# Patient Record
Sex: FEMALE | Race: WHITE | URBAN - METROPOLITAN AREA
[De-identification: names, ages, dates, MRNs, and addresses within clinical notes are randomized per-mention and may not be internally consistent; named-entity substitution may affect disease eponyms.]

---

## 2018-09-07 ENCOUNTER — TELEPHONE (OUTPATIENT)
Dept: FAMILY MEDICINE CLINIC | Age: 58
End: 2018-09-07

## 2018-09-07 NOTE — TELEPHONE ENCOUNTER
Patient phoned to say that she would like to come in for lab work in order to received refills on her medications for October. Does she to make an appt with the doctor for a follow up or can she have a lab request sent to [facility] to have the labwork without an appt scheduled.

## 2018-10-09 ENCOUNTER — CLINICAL SUPPORT (OUTPATIENT)
Dept: FAMILY MEDICINE CLINIC | Age: 58
End: 2018-10-09

## 2018-10-09 VITALS
DIASTOLIC BLOOD PRESSURE: 92 MMHG | HEIGHT: 60 IN | HEART RATE: 51 BPM | SYSTOLIC BLOOD PRESSURE: 182 MMHG | TEMPERATURE: 98.2 F | WEIGHT: 141 LBS | OXYGEN SATURATION: 99 % | BODY MASS INDEX: 27.68 KG/M2 | RESPIRATION RATE: 18 BRPM

## 2018-10-09 DIAGNOSIS — Z23 ENCOUNTER FOR IMMUNIZATION: ICD-10-CM

## 2018-10-09 DIAGNOSIS — I10 ESSENTIAL HYPERTENSION: Primary | ICD-10-CM

## 2018-10-09 DIAGNOSIS — E03.9 ACQUIRED HYPOTHYROIDISM: ICD-10-CM

## 2018-10-09 RX ORDER — LISINOPRIL 10 MG/1
20 TABLET ORAL DAILY
Qty: 90 TAB | Refills: 1 | Status: SHIPPED | OUTPATIENT
Start: 2018-10-09 | End: 2018-10-09 | Stop reason: CLARIF

## 2018-10-09 RX ORDER — LISINOPRIL 20 MG/1
20 TABLET ORAL DAILY
Qty: 90 TAB | Refills: 1 | Status: SHIPPED | OUTPATIENT
Start: 2018-10-09 | End: 2019-04-30 | Stop reason: SDUPTHER

## 2018-10-09 RX ORDER — LEVOTHYROXINE SODIUM 100 UG/1
TABLET ORAL
COMMUNITY
Start: 2018-09-22 | End: 2018-10-09 | Stop reason: SDUPTHER

## 2018-10-09 RX ORDER — LISINOPRIL 10 MG/1
TABLET ORAL DAILY
COMMUNITY
End: 2018-10-09 | Stop reason: SDUPTHER

## 2018-10-09 RX ORDER — LEVOTHYROXINE SODIUM 100 UG/1
100 TABLET ORAL
Qty: 90 TAB | Refills: 1 | Status: SHIPPED | OUTPATIENT
Start: 2018-10-09 | End: 2019-04-22 | Stop reason: SDUPTHER

## 2018-10-09 NOTE — PROGRESS NOTES
Darcie Jones is a 60 y.o. female presenting for Hypertension and Thyroid Problem HTN The patient presents today for HTN follow-up. Currently taking lisinopril 10 mg Taking medications daily w/o complications. Admits missing some doses Home BP readings range from [employer] SIde effects of meds:  None dry cough in past that resolved Comorbid conditions:  No hypercholesterolemia, nondiabetic, former smoker Aspirin? No 
 
Hypothyroidism Levothyroxine 100mcg Doesn't miss that medication Last blood work 6 months ago. Needs flu shot and Shingrix ROS Past Medical History:  
Diagnosis Date  
 HTN (hypertension)  Hypothyroidism History reviewed. No pertinent surgical history. Family History Problem Relation Age of Onset  Elevated Lipids Mother  Hypertension Mother  Pancreatic Cancer Mother  Collagen Dis Father  Colon Cancer Father Social History Social History  Marital status:  Spouse name: N/A  
 Number of children: N/A  
 Years of education: N/A Occupational History  Not on file. Social History Main Topics  Smoking status: Former Smoker Packs/day: 1.00 Types: Cigarettes  Smokeless tobacco: Never Used  Alcohol use No  
 Drug use: No  
 Sexual activity: Not on file Other Topics Concern  Not on file Social History Narrative  No narrative on file Current Outpatient Prescriptions Medication Sig Dispense Refill  docosahexanoic acid/epa (FISH OIL PO) Take  by mouth.  lisinopril (PRINIVIL, ZESTRIL) 10 mg tablet Take 2 Tabs by mouth daily. 90 Tab 1  
 levothyroxine (SYNTHROID) 100 mcg tablet Take 1 Tab by mouth Daily (before breakfast). 90 Tab 1  varicella-zoster recombinant, PF, (SHINGRIX) 50 mcg/0.5 mL susr injection 0.5 mL by IntraMUSCular route once for 1 dose. Repeat second dose in 6 months. 0.5 mL 1  
 levothyroxine sodium (LEVOTHYROXINE PO) Take  by mouth. No Known Allergies Vitals:  
 10/09/18 1637 BP: (!) 182/92 Pulse: (!) 51 Resp: 18 Temp: 98.2 °F (36.8 °C) TempSrc: Oral  
SpO2: 99% Weight: 141 lb (64 kg) Height: 4' 11.5\" (1.511 m) Body mass index is 28 kg/(m^2). BP repeated and confirmed Objective General: Patient alert and oriented and in NAD HEENT: PER/EOMI, no conjunctival pallor or scleral icterus. No thyromegaly or cervical lymphadenopathy Heart: Regular rate and rhythm, No murmurs, rubs or gallops. Lungs: Clear to auscultation bilaterally, no wheezing, rales or rhonchi Ext: No edema Skin: No rashes or lesions noted on exposed skin Neuro: AAOx3 Psych: Appropriate mood and affect Assessment and Plan: 1. Essential hypertension Some compliance issues, missing some doses. Perhaps slightly worse due to aleve. Not adequately controlled Increase lisinopril to 20 mg per day Regular BP checks. Ways to improve compliance discussed. - lisinopril (PRINIVIL, ZESTRIL) 10 mg tablet; Take 2 Tabs by mouth daily. Dispense: 90 Tab; Refill: 1 
- METABOLIC PANEL, BASIC 
- MICROALBUMIN, UR, RAND W/ MICROALB/CREAT RATIO 
- LIPID PANEL 
- HEPATIC FUNCTION PANEL 2. Acquired hypothyroidism Taking more consistently  For labs - levothyroxine (SYNTHROID) 100 mcg tablet; Take 1 Tab by mouth Daily (before breakfast). Dispense: 90 Tab; Refill: 1 
- TSH 3RD GENERATION 3. Encounter for immunization Immunizations updated. - Influenza virus vaccine (FLUZONE HIGH-DOSE) 65 years and older 
- varicella-zoster recombinant, PF, (SHINGRIX) 50 mcg/0.5 mL susr injection; 0.5 mL by IntraMUSCular route once for 1 dose. Repeat second dose in 6 months. Dispense: 0.5 mL; Refill: 1 Diagnosis and plan discussed with patient who verbillized understanding. Follow-up Disposition: 
Return in about 1 month (around 11/9/2018) for Blood pressure follow up.  
 
[facility]

## 2018-10-09 NOTE — MR AVS SNAPSHOT
[address] [place] [address] [address] 
[phone] Patient: Mera Plasencia MRN: [id number] [facility]:[birth year] Visit Information Date & Time Provider Department Dept. Phone Encounter #  
 10/9/2018  4:15 PM MD Andie Steele [id number] [phone] [id number] Follow-up Instructions Return in about 1 month (around 11/9/2018) for Blood pressure follow up. Upcoming Health Maintenance Date Due Hepatitis C Screening 1960 PAP AKA CERVICAL CYTOLOGY 4/15/1980 Shingrix Vaccine Age 50> (1 of 2) 4/15/2009 BREAST CANCER SCRN MAMMOGRAM 4/15/2009 FOBT Q 1 YEAR AGE 50-75 4/15/2009 Influenza Age 5 to Adult 8/1/2018 DTaP/Tdap/Td series (2 - Td) 10/1/2027 Allergies as of 10/9/2018  Review Complete On: 10/9/2018 By: Erin Fowler LPN No Known Allergies Current Immunizations  Never Reviewed Name Date Tdap 10/1/2017 Not reviewed this visit You Were Diagnosed With   
  
 Codes Comments Essential hypertension    -  Primary ICD-10-CM: I10 
ICD-9-CM: 401.9 Acquired hypothyroidism     ICD-10-CM: E03.9 ICD-9-CM: 057. 9 Vitals BP Pulse Temp Resp Height(growth percentile) Weight(growth percentile) (!) 182/92 (BP 1 Location: Right arm, BP Patient Position: Sitting) (!) 51 98.2 °F (36.8 °C) (Oral) 18 4' 11.5\" (1.511 m) 141 lb (64 kg) SpO2 BMI OB Status Smoking Status 99% 28 kg/m2 Postmenopausal Former Smoker Vitals History BMI and BSA Data Body Mass Index Body Surface Area  
 28 kg/m 2 1.64 m 2 Preferred Pharmacy Pharmacy Name Phone [facility] [address] [place] [address] [phone] Your Updated Medication List  
  
   
This list is accurate as of 10/9/18  5:22 PM.  Always use your most recent med list.  
  
  
  
  
 FISH OIL PO Take  by mouth.   
  
 * LEVOTHYROXINE PO  
 Take  by mouth. * levothyroxine 100 mcg tablet Commonly known as:  SYNTHROID Take 1 Tab by mouth Daily (before breakfast). lisinopril 10 mg tablet Commonly known as:  Kiera Torres Take 2 Tabs by mouth daily. * Notice: This list has 2 medication(s) that are the same as other medications prescribed for you. Read the directions carefully, and ask your doctor or other care provider to review them with you. Prescriptions Sent to Pharmacy Refills  
 lisinopril (PRINIVIL, ZESTRIL) 10 mg tablet 1 Sig: Take 2 Tabs by mouth daily. Class: Normal  
 Pharmacy: [id number] [address] Ph #: [phone] Route: Oral  
 levothyroxine (SYNTHROID) 100 mcg tablet 1 Sig: Take 1 Tab by mouth Daily (before breakfast). Class: Normal  
 Pharmacy: [id number], [facility] [address] Ph #: [phone] Route: Oral  
  
We Performed the Following HEPATIC FUNCTION PANEL [54906 CPT(R)] LIPID PANEL [14664 CPT(R)] METABOLIC PANEL, BASIC [45889 CPT(R)] MICROALBUMIN, UR, RAND W/ MICROALB/CREAT RATIO [id number] CPT(R)] TSH 3RD GENERATION [08261 CPT(R)] Follow-up Instructions Return in about 1 month (around 11/9/2018) for Blood pressure follow up. Patient Instructions Hypertension: Advised the patient to meassure blood pressure at home and to bring logs at the next visit. Advised to be compliant with BP medication. Recommendations for Hypertension (elevated blood pressure):   
· Purchase a blood pressure cuff that goes around your upper arm and check blood pressure at least 3 times per week. Write down your numbers for review. Check blood pressure in the morning and evening.  Rest for 5 minutes with feet elevated and arm resting on a table (at mid-chest level) when checking blood pressure   
· Exercise 30-60 minutes most days of the week, preferably with a mix of cardiovascular and strength training. Exercise can improve blood pressure, even if you do not lose weight!   
· Limit alcohol intake to less than 3-4 drinks per week.  · Avoid tobacco products   
· Avoid illegal drugs especially amphetamines · DASH diet - includes fruits, vegetables, fiber, and less than 2000 mg sodium (salt) daily.   
· Try to eat a low sugar diet. Sugar worsens diabetes and activates kidney hormones that raise blood pressure.  · Avoid non-steroidal inflammatory medications (NSAIDS) such as ibuprofen, advil, motrin, aleve, and naproxyn as these may increase blood pressure if used long-term   
· Avoid OTC decongestants such as pseudopherine, phenylephrine, Afrin Patient is counseled to return to the office if symptoms do not improve as expected.  Urgent consultation with the nearest Emergency Department is strongly recommended if condition worsens.  Patient is counseled to follow up as recommended and to inform the office if any changes in treatment are recommended.  Introducing \[facility] [place] [facility]\"" & HEALTH SERVICES! [facility] introduces ScreenTag patient portal. Now you can access parts of your medical record, email your doctor's office, and request medication refills online. 1. In your internet browser, go to [email or web address] 2. Click on the First Time User? Click Here link in the Sign In box. You will see the New Member Sign Up page. 3. Enter your ScreenTag Access Code exactly as it appears below. You will not need to use this code after youve completed the sign-up process. If you do not sign up before the expiration date, you must request a new code. · ScreenTag Access Code: [email or web address] Expires: 1/7/2019  3:45 PM 
 
4. Enter the last four digits of your Social Security Number (xxxx) and Date of Birth (mm/dd/yyyy) as indicated and click Submit. You will be taken to the next sign-up page. 5. Create a ScreenTag ID.  This will be your ScreenTag login ID and cannot be changed, so think of one that is secure and easy to remember. 6. Create a [employer] password. You can change your password at any time. 7. Enter your Password Reset Question and Answer. This can be used at a later time if you forget your password. 8. Enter your e-mail address. You will receive e-mail notification when new information is available in [address] 9. Click Sign Up. You can now view and download portions of your medical record. 10. Click the Download Summary menu link to download a portable copy of your medical information. If you have questions, please visit the Frequently Asked Questions section of the [employer] website. Remember, [employer] is NOT to be used for urgent needs. For medical emergencies, dial 911. Now available from your iPhone and Android! Please provide this summary of care documentation to your next provider. Your primary care clinician is listed as Richard Ramirez. If you have any questions after today's visit, please call [phone].

## 2018-10-09 NOTE — PATIENT INSTRUCTIONS
Hypertension: Advised the patient to meassure blood pressure at home and to bring logs at the next visit. Advised to be compliant with BP medication. Recommendations for Hypertension (elevated blood pressure):   
· Purchase a blood pressure cuff that goes around your upper arm and check blood pressure at least 3 times per week. Write down your numbers for review. Check blood pressure in the morning and evening. Rest for 5 minutes with feet elevated and arm resting on a table (at mid-chest level) when checking blood pressure   
· Exercise 30-60 minutes most days of the week, preferably with a mix of cardiovascular and strength training. Exercise can improve blood pressure, even if you do not lose weight!   
· Limit alcohol intake to less than 3-4 drinks per week.  · Avoid tobacco products   
· Avoid illegal drugs especially amphetamines · DASH diet - includes fruits, vegetables, fiber, and less than 2000 mg sodium (salt) daily.   
· Try to eat a low sugar diet. Sugar worsens diabetes and activates kidney hormones that raise blood pressure.  · Avoid non-steroidal inflammatory medications (NSAIDS) such as ibuprofen, advil, motrin, aleve, and naproxyn as these may increase blood pressure if used long-term   
· Avoid OTC decongestants such as pseudopherine, phenylephrine, Afrin Patient is counseled to return to the office if symptoms do not improve as expected.  Urgent consultation with the nearest Emergency Department is strongly recommended if condition worsens.  Patient is counseled to follow up as recommended and to inform the office if any changes in treatment are recommended.

## 2018-10-10 ENCOUNTER — TELEPHONE (OUTPATIENT)
Dept: FAMILY MEDICINE CLINIC | Age: 58
End: 2018-10-10

## 2018-10-10 NOTE — TELEPHONE ENCOUNTER
Pharmacist of [employer] on  [facility], [place], [address] [place] [address]  Tel: [phone]    Seeking clarification on patients medication for daily dose of Lisinopril, either 10mg or 20mg. Can you contact pharmacy to clarify this please?     Thank You  Freada Cranker

## 2018-10-14 LAB
ALBUMIN SERPL-MCNC: 4.7 G/DL (ref 3.5–5.5)
ALBUMIN/CREAT UR: <16.2 MG/G CREAT (ref 0–30)
ALP SERPL-CCNC: 66 IU/L (ref 39–117)
ALT SERPL-CCNC: 8 IU/L (ref 0–32)
AST SERPL-CCNC: 15 IU/L (ref 0–40)
BILIRUB DIRECT SERPL-MCNC: 0.08 MG/DL (ref 0–0.4)
BILIRUB SERPL-MCNC: 0.3 MG/DL (ref 0–1.2)
BUN SERPL-MCNC: 16 MG/DL (ref 6–24)
BUN/CREAT SERPL: 27 (ref 9–23)
CALCIUM SERPL-MCNC: 9.5 MG/DL (ref 8.7–10.2)
CHLORIDE SERPL-SCNC: 103 MMOL/L (ref 96–106)
CHOLEST SERPL-MCNC: 156 MG/DL (ref 100–199)
CO2 SERPL-SCNC: 24 MMOL/L (ref 20–29)
CREAT SERPL-MCNC: 0.6 MG/DL (ref 0.57–1)
CREAT UR-MCNC: 18.5 MG/DL
GLUCOSE SERPL-MCNC: 87 MG/DL (ref 65–99)
HDLC SERPL-MCNC: 50 MG/DL
LDLC SERPL CALC-MCNC: 85 MG/DL (ref 0–99)
MICROALBUMIN UR-MCNC: <3 UG/ML
POTASSIUM SERPL-SCNC: 4.4 MMOL/L (ref 3.5–5.2)
PROT SERPL-MCNC: 6.8 G/DL (ref 6–8.5)
SODIUM SERPL-SCNC: 141 MMOL/L (ref 134–144)
TRIGL SERPL-MCNC: 107 MG/DL (ref 0–149)
TSH SERPL DL<=0.005 MIU/L-ACNC: 1.1 UIU/ML (ref 0.45–4.5)
VLDLC SERPL CALC-MCNC: 21 MG/DL (ref 5–40)

## 2018-10-15 NOTE — PROGRESS NOTES
Called patient advised of lab results, need to take daily BP at home and to return in 1 month for appointment with Dr. Yosvany Lozano to assess BP control. Patient stated understanding.

## 2018-10-15 NOTE — PROGRESS NOTES
Call patient. Blood work is fine. Should see me in about a month to recheck BP Get BP checks in the interim.  
RH

## 2018-11-15 ENCOUNTER — OFFICE VISIT (OUTPATIENT)
Dept: FAMILY MEDICINE CLINIC | Age: 58
End: 2018-11-15

## 2018-11-15 VITALS
SYSTOLIC BLOOD PRESSURE: 138 MMHG | OXYGEN SATURATION: 98 % | HEART RATE: 59 BPM | WEIGHT: 142 LBS | BODY MASS INDEX: 27.88 KG/M2 | RESPIRATION RATE: 18 BRPM | HEIGHT: 60 IN | TEMPERATURE: 98.2 F | DIASTOLIC BLOOD PRESSURE: 82 MMHG

## 2018-11-15 DIAGNOSIS — I10 ESSENTIAL HYPERTENSION: Primary | ICD-10-CM

## 2018-11-15 NOTE — PROGRESS NOTES
Bernice Friday is a 60 y.o. female presenting for Elevated Blood Pressure      History of Present Illness:  HTN followup  Taking higher dose of ACE consistently    No problems with meds. 80% of BP's below 140 syst.    Review of Systems   Respiratory: Negative for cough and shortness of breath. Cardiovascular: Negative for chest pain. Neurological: Negative for loss of consciousness. Past Medical History:   Diagnosis Date    HTN (hypertension)     Hypothyroidism      History reviewed. No pertinent surgical history. Family History   Problem Relation Age of Onset    Elevated Lipids Mother     Hypertension Mother     Pancreatic Cancer Mother     Collagen Dis Father     Colon Cancer Father      Social History     Socioeconomic History    Marital status:      Spouse name: Not on file    Number of children: Not on file    Years of education: Not on file    Highest education level: Not on file   Social Needs    Financial resource strain: Not on file    Food insecurity - worry: Not on file    Food insecurity - inability: Not on file   [employer] needs - medical: Not on file   [employer] needs - non-medical: Not on file   Occupational History    Not on file   Tobacco Use    Smoking status: Former Smoker     Packs/day: 1.00     Types: Cigarettes    Smokeless tobacco: Never Used   Substance and Sexual Activity    Alcohol use: No    Drug use: No    Sexual activity: Not on file   Other Topics Concern    Not on file   Social History Narrative    Not on file         Current Outpatient Medications   Medication Sig Dispense Refill    docosahexanoic acid/epa (FISH OIL PO) Take  by mouth.  levothyroxine (SYNTHROID) 100 mcg tablet Take 1 Tab by mouth Daily (before breakfast). 90 Tab 1    lisinopril (PRINIVIL, ZESTRIL) 20 mg tablet Take 1 Tab by mouth daily.  90 Tab 1         No Known Allergies    Vitals:    11/15/18 0944   BP: 138/82   Pulse: (!) 59   Resp: 18   Temp: 98.2 °F (36.8 °C)   TempSrc: Oral   SpO2: 98%   Weight: 142 lb (64.4 kg)   Height: 4' 11.5\" (1.511 m)     Body mass index is 28.2 kg/m². Objective  General: Patient alert and oriented and in NAD  HEENT: PER/EOMI, no conjunctival pallor or scleral icterus. No thyromegaly or cervical lymphadenopathy  Heart: Regular rate and rhythm, No murmurs, rubs or gallops. Lungs: Clear to auscultation bilaterally, no wheezing, rales or rhonchi  Ext: No edema  Skin: No rashes or lesions noted on exposed skin  Neuro: AAOx3  Psych: Appropriate mood and affect      Assessment and Plan:    1. Essential hypertension at goal  Taking meds consistently, BP goals discussed. FU 5-6 months or sooner if not at goal        Diagnosis and plan discussed with patient who verbillized understanding.     Follow-up Disposition: Not on File    [facility]

## 2019-04-22 ENCOUNTER — TELEPHONE (OUTPATIENT)
Dept: FAMILY MEDICINE CLINIC | Age: 59
End: 2019-04-22

## 2019-04-22 DIAGNOSIS — I10 ESSENTIAL HYPERTENSION: Primary | ICD-10-CM

## 2019-04-22 DIAGNOSIS — E78.00 HYPERCHOLESTEREMIA: ICD-10-CM

## 2019-04-28 LAB
ALBUMIN SERPL-MCNC: 4.3 G/DL (ref 3.6–4.8)
ALP SERPL-CCNC: 70 IU/L (ref 39–117)
ALT SERPL-CCNC: 11 IU/L (ref 0–32)
AST SERPL-CCNC: 13 IU/L (ref 0–40)
BILIRUB DIRECT SERPL-MCNC: 0.1 MG/DL (ref 0–0.4)
BILIRUB SERPL-MCNC: 0.3 MG/DL (ref 0–1.2)
BUN SERPL-MCNC: 20 MG/DL (ref 8–27)
BUN/CREAT SERPL: 29 (ref 12–28)
CALCIUM SERPL-MCNC: 9.7 MG/DL (ref 8.7–10.3)
CHLORIDE SERPL-SCNC: 102 MMOL/L (ref 96–106)
CHOLEST SERPL-MCNC: 143 MG/DL (ref 100–199)
CO2 SERPL-SCNC: 22 MMOL/L (ref 20–29)
CREAT SERPL-MCNC: 0.68 MG/DL (ref 0.57–1)
GLUCOSE SERPL-MCNC: 96 MG/DL (ref 65–99)
HDLC SERPL-MCNC: 51 MG/DL
LDLC SERPL CALC-MCNC: 80 MG/DL (ref 0–99)
POTASSIUM SERPL-SCNC: 4.6 MMOL/L (ref 3.5–5.2)
PROT SERPL-MCNC: 6.6 G/DL (ref 6–8.5)
SODIUM SERPL-SCNC: 140 MMOL/L (ref 134–144)
TRIGL SERPL-MCNC: 62 MG/DL (ref 0–149)
VLDLC SERPL CALC-MCNC: 12 MG/DL (ref 5–40)

## 2019-04-30 ENCOUNTER — OFFICE VISIT (OUTPATIENT)
Dept: FAMILY MEDICINE CLINIC | Age: 59
End: 2019-04-30

## 2019-04-30 VITALS
TEMPERATURE: 98.2 F | WEIGHT: 143 LBS | BODY MASS INDEX: 28.07 KG/M2 | HEIGHT: 60 IN | OXYGEN SATURATION: 100 % | DIASTOLIC BLOOD PRESSURE: 79 MMHG | SYSTOLIC BLOOD PRESSURE: 145 MMHG | RESPIRATION RATE: 18 BRPM | HEART RATE: 68 BPM

## 2019-04-30 DIAGNOSIS — E03.9 ACQUIRED HYPOTHYROIDISM: ICD-10-CM

## 2019-04-30 DIAGNOSIS — I10 ESSENTIAL HYPERTENSION: Primary | ICD-10-CM

## 2019-04-30 RX ORDER — LISINOPRIL 20 MG/1
20 TABLET ORAL DAILY
Qty: 90 TAB | Refills: 1 | Status: SHIPPED | OUTPATIENT
Start: 2019-04-30 | End: 2019-10-22 | Stop reason: SDUPTHER

## 2019-04-30 RX ORDER — LEVOTHYROXINE SODIUM 100 UG/1
TABLET ORAL
Qty: 90 TAB | Refills: 1 | Status: SHIPPED | OUTPATIENT
Start: 2019-04-30 | End: 2019-10-22 | Stop reason: SDUPTHER

## 2019-04-30 NOTE — PROGRESS NOTES
Assessment and Plan 1. Acquired hypothyroidism Euthyroid on replacement, no problems, continue same dose - levothyroxine (SYNTHROID) 100 mcg tablet; TAKE ONE TABLET BY MOUTH DAILY BEFORE BREAKFAST  Dispense: 90 Tab; Refill: 1 2. Essential hypertension Near goal here today and well under goal at home with cuff that we have checked and found to be accurate 
- lisinopril (PRINIVIL, ZESTRIL) 20 mg tablet; Take 1 Tab by mouth daily. Dispense: 90 Tab; Refill: 1 Follow-up and Dispositions · Return in about 6 months (around 10/30/2019) for Blood pressure follow up, Medication follow up. Diagnosis and plan discussed with patient who verbillized understanding. History of present illness:Jalil Willough[place] is a 60 y.o. female presenting for Medication Refill Hypertension Doing well No chest pain or dyspnea Mows lawn without symptoms (push mower) Hypothyroid TSH OK in October Takes consistently Review of Systems Respiratory: Negative for shortness of breath. Cardiovascular: Negative for chest pain, palpitations and leg swelling. Gastrointestinal: Negative. Genitourinary: Negative. Neurological: Negative for headaches. Past Medical History:  
Diagnosis Date  
 HTN (hypertension)  Hypothyroidism History reviewed. No pertinent surgical history. Family History Problem Relation Age of Onset  Elevated Lipids Mother  Hypertension Mother  Pancreatic Cancer Mother  Collagen Dis Father  Colon Cancer Father Social History Socioeconomic History  Marital status:  Spouse name: Not on file  Number of children: Not on file  Years of education: Not on file  Highest education level: Not on file Occupational History  Not on file Social Needs  Financial resource strain: Not on file  Food insecurity:  
  Worry: Not on file Inability: Not on file  Transportation needs:  
  Medical: Not on file Non-medical: Not on file Tobacco Use  Smoking status: Former Smoker Packs/day: 1.00 Types: Cigarettes  Smokeless tobacco: Never Used Substance and Sexual Activity  Alcohol use: No  
 Drug use: No  
 Sexual activity: Not on file Lifestyle  Physical activity:  
  Days per week: Not on file Minutes per session: Not on file  Stress: Not on file Relationships  Social connections:  
  Talks on phone: Not on file Gets together: Not on file Attends Anabaptism service: Not on file Active member of club or organization: Not on file Attends meetings of clubs or organizations: Not on file Relationship status: Not on file  Intimate partner violence:  
  Fear of current or ex partner: Not on file Emotionally abused: Not on file Physically abused: Not on file Forced sexual activity: Not on file Other Topics Concern  Not on file Social History Narrative  Not on file Current Outpatient Medications Medication Sig Dispense Refill  levothyroxine (SYNTHROID) 100 mcg tablet TAKE ONE TABLET BY MOUTH DAILY BEFORE BREAKFAST 90 Tab 1  
 lisinopril (PRINIVIL, ZESTRIL) 20 mg tablet Take 1 Tab by mouth daily. 90 Tab 1  
 docosahexanoic acid/epa (FISH OIL PO) Take  by mouth. No Known Allergies Vitals:  
 04/30/19 1629 BP: 145/79 Pulse: 68 Resp: 18 Temp: 98.2 °F (36.8 °C) TempSrc: Oral  
SpO2: 100% Weight: 143 lb (64.9 kg) Height: 4' 11.5\" (1.511 m) Body mass index is 28.4 kg/m². Objective General: Patient alert and oriented and in NAD Eyes: PER/EOMI, no conjunctival pallor or scleral icterus. Neck: No thyromegaly or cervical lymphadenopathy Cardiovascular: Heart has regular rate and rhythm, No murmurs, rubs or gallops. No edema Respiratory: Lungs are clear to auscultation bilaterally, no wheezing, rales or rhonchi, normal chest excursion and no increased work of breathing. Musculoskeletal: All four extremities present and functional.  
Skin: No rashes or lesions noted on exposed skin Neuro: AAOx3, normal gait and speech. No gross neurologic deficits. Psych: Appropriate mood and affect, no homicidal or suicidal ideation, no obsessions, delusions or hallucinations, normal psychomotor status.

## 2019-04-30 NOTE — PROGRESS NOTES
Identified pt with two pt identifiers(name and ). Reviewed record in preparation for visit and have obtained necessary documentation. All patient medications has been reviewed. Chief Complaint Patient presents with  Medication Refill Health Maintenance Due Topic  Hepatitis C Screening  PAP AKA CERVICAL CYTOLOGY  Shingrix Vaccine Age 50> (1 of 2)  BREAST CANCER SCRN MAMMOGRAM   
 FOBT Q 1 YEAR AGE 50-75 Vitals:  
 19 1629 BP: 145/79 Pulse: 68 Resp: 18 Temp: 98.2 °F (36.8 °C) TempSrc: Oral  
SpO2: 100% Weight: 143 lb (64.9 kg) Height: 4' 11.5\" (1.511 m) PainSc:   0 - No pain Coordination of Care Questionnaire: 
:  
1) Have you been to an emergency room, urgent care, or hospitalized since your last visit?   no    
 
2. Have seen or consulted any other health care provider since your last visit? NO 
 
3) Do you have an Advanced Directive/ Living Will in place? NO If yes, do we have a copy on file NO If no, would you like information NO Patient is accompanied by self I have received verbal consent from Denita Reddy to discuss any/all medical information while they are present in the room.

## 2019-10-22 ENCOUNTER — OFFICE VISIT (OUTPATIENT)
Dept: FAMILY MEDICINE CLINIC | Age: 59
End: 2019-10-22

## 2019-10-22 VITALS
SYSTOLIC BLOOD PRESSURE: 152 MMHG | OXYGEN SATURATION: 100 % | DIASTOLIC BLOOD PRESSURE: 84 MMHG | WEIGHT: 145.5 LBS | RESPIRATION RATE: 16 BRPM | HEIGHT: 60 IN | BODY MASS INDEX: 28.57 KG/M2 | HEART RATE: 66 BPM | TEMPERATURE: 98.3 F

## 2019-10-22 DIAGNOSIS — E03.9 ACQUIRED HYPOTHYROIDISM: ICD-10-CM

## 2019-10-22 DIAGNOSIS — I10 ESSENTIAL HYPERTENSION: ICD-10-CM

## 2019-10-22 DIAGNOSIS — Z23 ENCOUNTER FOR IMMUNIZATION: Primary | ICD-10-CM

## 2019-10-22 RX ORDER — LEVOTHYROXINE SODIUM 100 UG/1
TABLET ORAL
Qty: 90 TAB | Refills: 1 | Status: SHIPPED | OUTPATIENT
Start: 2019-10-22 | End: 2020-04-06 | Stop reason: SDUPTHER

## 2019-10-22 RX ORDER — LISINOPRIL 20 MG/1
20 TABLET ORAL DAILY
Qty: 90 TAB | Refills: 1 | Status: SHIPPED | OUTPATIENT
Start: 2019-10-22 | End: 2020-04-06 | Stop reason: SDUPTHER

## 2019-10-22 NOTE — PROGRESS NOTES
Assessment and Plan    1. Acquired hypothyroidism  Refilled. Take consistently  - levothyroxine (SYNTHROID) 100 mcg tablet; TAKE ONE TABLET BY MOUTH DAILY BEFORE BREAKFAST  Dispense: 90 Tab; Refill: 1    2. Essential hypertension  Not at goal today but we have only two BP's and some BP's at home are normal  To check frequently and see us if not at goal  - lisinopril (PRINIVIL, ZESTRIL) 20 mg tablet; Take 1 Tab by mouth daily. Dispense: 90 Tab; Refill: 1    3. Encounter for immunization  Flu shot. - INFLUENZA VIRUS VAC QUAD,SPLIT,PRESV FREE SYRINGE IM      Follow-up and Dispositions    · Return in about 6 months (around 4/22/2020) for Blood pressure follow up. Diagnosis and plan discussed with patient who verbillized understanding. History of present Jeanine Collins is a 60 y.o. female presenting for Hypertension and Immunization/Injection (Flu shot )    Hypertension  Home BP's variable  Some in 120's, no other sx  Elevated today    Hypothyroid  Takes meds consistently    Needs flu shot    Review of Systems   Respiratory: Negative for shortness of breath. Cardiovascular: Negative for chest pain and palpitations. Gastrointestinal: Negative. Genitourinary: Negative. Psychiatric/Behavioral: Negative. Past Medical History:   Diagnosis Date    HTN (hypertension)     Hypothyroidism      History reviewed. No pertinent surgical history.   Family History   Problem Relation Age of Onset    Elevated Lipids Mother     Hypertension Mother     Pancreatic Cancer Mother     Collagen Dis Father     Colon Cancer Father      Social History     Socioeconomic History    Marital status:      Spouse name: Not on file    Number of children: Not on file    Years of education: Not on file    Highest education level: Not on file   Occupational History    Not on file   Social Needs    Financial resource strain: Not on file    Food insecurity:     Worry: Not on file     Inability: Not on file   [employer] needs:     Medical: Not on file     Non-medical: Not on file   Tobacco Use    Smoking status: Former Smoker     Packs/day: 1.00     Types: Cigarettes    Smokeless tobacco: Never Used   Substance and Sexual Activity    Alcohol use: No    Drug use: Never    Sexual activity: Yes   Lifestyle    Physical activity:     Days per week: Not on file     Minutes per session: Not on file    Stress: Not on file   Relationships    Social connections:     Talks on phone: Not on file     Gets together: Not on file     Attends Protestant service: Not on file     Active member of club or organization: Not on file     Attends meetings of clubs or organizations: Not on file     Relationship status: Not on file    Intimate partner violence:     Fear of current or ex partner: Not on file     Emotionally abused: Not on file     Physically abused: Not on file     Forced sexual activity: Not on file   Other Topics Concern    Not on file   Social History Narrative    Not on file         Current Outpatient Medications   Medication Sig Dispense Refill    levothyroxine (SYNTHROID) 100 mcg tablet TAKE ONE TABLET BY MOUTH DAILY BEFORE BREAKFAST 90 Tab 1    lisinopril (PRINIVIL, ZESTRIL) 20 mg tablet Take 1 Tab by mouth daily. 90 Tab 1    docosahexanoic acid/epa (FISH OIL PO) Take  by mouth. No Known Allergies    Vitals:    10/22/19 1558   BP: 152/84   Pulse: 66   Resp: 16   Temp: 98.3 °F (36.8 °C)   TempSrc: Oral   SpO2: 100%   Weight: 145 lb 8 oz (66 kg)   Height: 4' 11.5\" (1.511 m)     Body mass index is 28.9 kg/m². Objective  Physical Exam   Constitutional: She is oriented to person, place, and time. She appears well-developed and well-nourished. No distress. Eyes: Conjunctivae are normal.   Neck: Neck supple. No thyromegaly present. Cardiovascular: Normal rate, regular rhythm and normal heart sounds. Exam reveals no gallop and no friction rub. No murmur heard.   Pulmonary/Chest: Effort normal and breath sounds normal. No respiratory distress. She has no wheezes. She has no rales. Musculoskeletal: She exhibits no edema. Lymphadenopathy:     She has no cervical adenopathy. Neurological: She is alert and oriented to person, place, and time. Skin: She is not diaphoretic. Psychiatric: She has a normal mood and affect. Her behavior is normal. Judgment and thought content normal.   Nursing note and vitals reviewed. General: Patient alert and oriented and in NAD  Eyes: PER/EOMI, no conjunctival pallor or scleral icterus. ENT: Nares normal, TM's clear with normal architecture and light reflex, Mouth normal, throat without exudate, uvula midline  Neck: No thyromegaly or cervical lymphadenopathy  Cardiovascular: Heart has regular rate and rhythm, No murmurs, rubs or gallops. No edema  Respiratory: Lungs are clear to auscultation bilaterally, no wheezing, rales or rhonchi, normal chest excursion and no increased work of breathing. Gastorintestinal: abdomen is non-tender, non-distended, without organomegaly or masses  Genitourinary: exam deferred  Musculoskeletal: All four extremities present and functional.   Skin: No rashes or lesions noted on exposed skin  Neuro: AAOx3, normal gait and speech. No gross neurologic deficits. Psych: Appropriate mood and affect, no homicidal or suicidal ideation, no obsessions, delusions or hallucinations, normal psychomotor status.

## 2019-10-22 NOTE — PATIENT INSTRUCTIONS
The goal blood pressure for most patients is 140/90. The whole point of treating blood pressure is to prevent strokes, heart attacks and kidney damage. Persistently elevated blood pressure damages blood vessels and can lead to catastrophic heart problems and strokes. Recommendations for Hypertension (elevated blood pressure): · Purchase a blood pressure cuff that goes around your upper arm and check blood pressure at least 3 times per week. Write down your numbers for review. Check blood pressure in the morning and evening. Rest for 5 minutes with feet elevated and arm resting on a table (at mid-chest level) when checking blood pressure · Exercise 30-60 minutes most days of the week, preferably with a mix of cardiovascular and strength training. Exercise can improve blood pressure, even if you do not lose weight! · Limit alcohol intake to less than 3-4 drinks per week. · Avoid tobacco products · Avoid illegal drugs especially amphetamines · DASH diet - includes fruits, vegetables, fiber, and less than 2000 mg sodium (salt) daily. · Try to eat a low sugar diet. Sugar worsens diabetes and activates kidney hormones that raise blood pressure. · Avoid non-steroidal inflammatory medications (NSAIDS) such as ibuprofen, advil, motrin, aleve, and naproxyn as these may increase blood pressure if used long-term · Avoid OTC decongestants such as pseudopherine, phenylephrine, Afrin · Take your blood pressure medicine (and aspirin if prescribed) religiously

## 2019-10-22 NOTE — PROGRESS NOTES
Identified pt with two pt identifiers(name and ). Reviewed record in preparation for visit and have obtained necessary documentation. Chief Complaint   Patient presents with    Hypertension    Immunization/Injection     Flu shot         Health Maintenance Due   Topic    Hepatitis C Screening     PAP AKA CERVICAL CYTOLOGY     Shingrix Vaccine Age 50> (1 of 2)    BREAST CANCER SCRN MAMMOGRAM     FOBT Q 1 YEAR AGE 54-65     Influenza Age 5 to Adult        Coordination of Care Questionnaire:  :   1) Have you been to an emergency room, urgent care, or hospitalized since your last visit? If yes, where when, and reason for visit?   no       2. Have seen or consulted any other health care provider since your last visit?  If yes, where when, and reason for visit?  no

## 2020-04-06 DIAGNOSIS — I10 ESSENTIAL HYPERTENSION: ICD-10-CM

## 2020-04-06 DIAGNOSIS — E03.9 ACQUIRED HYPOTHYROIDISM: ICD-10-CM

## 2020-04-06 RX ORDER — LEVOTHYROXINE SODIUM 100 UG/1
TABLET ORAL
Qty: 90 TAB | Refills: 0 | Status: SHIPPED | OUTPATIENT
Start: 2020-04-06 | End: 2020-06-16 | Stop reason: SDUPTHER

## 2020-04-06 RX ORDER — LISINOPRIL 20 MG/1
20 TABLET ORAL DAILY
Qty: 90 TAB | Refills: 0 | Status: SHIPPED | OUTPATIENT
Start: 2020-04-06 | End: 2020-06-16 | Stop reason: SDUPTHER

## 2020-06-16 DIAGNOSIS — E03.9 ACQUIRED HYPOTHYROIDISM: ICD-10-CM

## 2020-06-16 DIAGNOSIS — I10 ESSENTIAL HYPERTENSION: ICD-10-CM

## 2020-06-16 RX ORDER — LEVOTHYROXINE SODIUM 100 UG/1
TABLET ORAL
Qty: 30 TAB | Refills: 0 | OUTPATIENT
Start: 2020-06-16

## 2020-06-16 RX ORDER — LISINOPRIL 20 MG/1
TABLET ORAL
Qty: 30 TAB | Refills: 0 | OUTPATIENT
Start: 2020-06-16

## 2020-06-16 RX ORDER — LISINOPRIL 20 MG/1
20 TABLET ORAL DAILY
Qty: 30 TAB | Refills: 0 | Status: SHIPPED | OUTPATIENT
Start: 2020-06-16 | End: 2020-06-29 | Stop reason: SDUPTHER

## 2020-06-16 RX ORDER — LEVOTHYROXINE SODIUM 100 UG/1
TABLET ORAL
Qty: 30 TAB | Refills: 0 | Status: SHIPPED | OUTPATIENT
Start: 2020-06-16 | End: 2020-06-29 | Stop reason: SDUPTHER

## 2020-06-16 NOTE — TELEPHONE ENCOUNTER
Call patient. Needs to be seen for refill of meds. BP and thyroid.   [facility]

## 2020-06-16 NOTE — TELEPHONE ENCOUNTER
I called in a month of each med and ordered labs. Should go to [facility], Fasting, needs to do by 26th of this month.   [facility]

## 2020-06-21 LAB
ALBUMIN SERPL-MCNC: 4.4 G/DL (ref 3.8–4.8)
ALBUMIN/CREAT UR: 17 MG/G CREAT (ref 0–29)
ALP SERPL-CCNC: 73 IU/L (ref 39–117)
ALT SERPL-CCNC: 19 IU/L (ref 0–32)
AST SERPL-CCNC: 16 IU/L (ref 0–40)
BILIRUB DIRECT SERPL-MCNC: 0.1 MG/DL (ref 0–0.4)
BILIRUB SERPL-MCNC: 0.3 MG/DL (ref 0–1.2)
BUN SERPL-MCNC: 13 MG/DL (ref 8–27)
BUN/CREAT SERPL: 18 (ref 12–28)
CALCIUM SERPL-MCNC: 9.6 MG/DL (ref 8.7–10.3)
CHLORIDE SERPL-SCNC: 106 MMOL/L (ref 96–106)
CHOLEST SERPL-MCNC: 183 MG/DL (ref 100–199)
CO2 SERPL-SCNC: 25 MMOL/L (ref 20–29)
CREAT SERPL-MCNC: 0.71 MG/DL (ref 0.57–1)
CREAT UR-MCNC: 42.5 MG/DL
GLUCOSE SERPL-MCNC: 94 MG/DL (ref 65–99)
HDLC SERPL-MCNC: 47 MG/DL
LDLC SERPL CALC-MCNC: 109 MG/DL (ref 0–99)
MICROALBUMIN UR-MCNC: 7.2 UG/ML
POTASSIUM SERPL-SCNC: 4.9 MMOL/L (ref 3.5–5.2)
PROT SERPL-MCNC: 6.3 G/DL (ref 6–8.5)
SODIUM SERPL-SCNC: 142 MMOL/L (ref 134–144)
TRIGL SERPL-MCNC: 133 MG/DL (ref 0–149)
TSH SERPL DL<=0.005 MIU/L-ACNC: 0.26 UIU/ML (ref 0.45–4.5)
VLDLC SERPL CALC-MCNC: 27 MG/DL (ref 5–40)

## 2020-06-29 ENCOUNTER — VIRTUAL VISIT (OUTPATIENT)
Dept: FAMILY MEDICINE CLINIC | Age: 60
End: 2020-06-29

## 2020-06-29 DIAGNOSIS — E03.9 ACQUIRED HYPOTHYROIDISM: ICD-10-CM

## 2020-06-29 DIAGNOSIS — I10 ESSENTIAL HYPERTENSION: ICD-10-CM

## 2020-06-29 RX ORDER — LISINOPRIL 20 MG/1
20 TABLET ORAL DAILY
Qty: 90 TAB | Refills: 1 | Status: SHIPPED | OUTPATIENT
Start: 2020-06-29 | End: 2020-07-16

## 2020-06-29 RX ORDER — LEVOTHYROXINE SODIUM 100 UG/1
TABLET ORAL
Qty: 90 TAB | Refills: 1 | Status: SHIPPED | OUTPATIENT
Start: 2020-06-29 | End: 2020-07-16

## 2020-06-29 NOTE — PROGRESS NOTES
Identified pt with two pt identifiers(name and ). Reviewed record in preparation for visit and have obtained necessary documentation. Chief Complaint   Patient presents with    Medication Refill        Health Maintenance Due   Topic    Hepatitis C Screening     PAP AKA CERVICAL CYTOLOGY     Shingrix Vaccine Age 49> (1 of 2)    Breast Cancer Screen Mammogram     FOBT Q1Y Age 54-65        Coordination of Care Questionnaire:  :   1) Have you been to an emergency room, urgent care, or hospitalized since your last visit? If yes, where when, and reason for visit? no      2. Have seen or consulted any other health care provider since your last visit? If yes, where when, and reason for visit?  no        Patient is accompanied by self I have received verbal consent from Kenneth Puente to discuss any/all medical information while they are present in the room.

## 2020-06-29 NOTE — PROGRESS NOTES
Mindy Whitfield is a 63 y.o. female who was seen by synchronous (real-time) audio-video technology on 6/29/2020. Consent: Mindy Whitfield, who was seen by synchronous (real-time) audio-video technology, and/or her healthcare decision maker, is aware that this patient-initiated, Telehealth encounter on 6/29/2020 is a billable service, with coverage as determined by her insurance carrier. She is aware that she may receive a bill and has provided verbal consent to proceed: Yes. Assessment & Plan:   Diagnoses and all orders for this visit:    1. Acquired hypothyroidism  -     levothyroxine (SYNTHROID) 100 mcg tablet; TAKE ONE TABLET BY MOUTH DAILY BEFORE BREAKFAST  Slight elevation of TSH. Previously normal on this dose. Continue and recheck in 6 months. 2. Essential hypertension  -     lisinopriL (PRINIVIL, ZESTRIL) 20 mg tablet; Take 1 Tab by mouth daily. At goal by home BP's  Cholesterol ok and AHA statin calculator says she does not need statin or ASA      Follow-up and Dispositions    · Return in about 6 months (around 12/29/2020) for Blood pressure follow up, Medication follow up. I spent at least 23 minutes on this visit with this established patient. ([id number])    Subjective:   Mindy Whitfield is a 63 y.o. female who was seen for Medication Refill    Hypertension  At goal by home BP's  No other sx  Taking med consistently    Hypothyroidism  TSH slightly suppressed    Doing ok with COVID restrictions, family is well. Prior to Admission medications    Medication Sig Start Date End Date Taking? Authorizing Provider   levothyroxine (SYNTHROID) 100 mcg tablet TAKE ONE TABLET BY MOUTH DAILY BEFORE BREAKFAST 6/29/20  Yes Gil Henson MD   lisinopriL (PRINIVIL, ZESTRIL) 20 mg tablet Take 1 Tab by mouth daily. 6/29/20  Yes Gil Henson MD   docosahexanoic acid/epa (FISH OIL PO) Take  by mouth.    Yes Provider, Historical   levothyroxine (SYNTHROID) 100 mcg tablet TAKE ONE TABLET BY MOUTH DAILY BEFORE BREAKFAST 6/16/20 6/29/20  Shital Rios MD   lisinopriL (PRINIVIL, ZESTRIL) 20 mg tablet Take 1 Tab by mouth daily. 6/16/20 6/29/20  Shital Rios MD     No Known Allergies        Review of Systems   Constitutional: Negative for weight loss. Respiratory: Negative for shortness of breath. Cardiovascular: Negative for chest pain and palpitations. Gastrointestinal: Negative. Genitourinary: Negative. Psychiatric/Behavioral: Negative. Objective:   Vital Signs: (As obtained by patient/caregiver at home)  There were no vitals taken for this visit.      [INSTRUCTIONS:  \"[x]\" Indicates a positive item  \"[]\" Indicates a negative item  -- DELETE ALL ITEMS NOT EXAMINED]    Constitutional: [x] Appears well-developed and well-nourished [x] No apparent distress      [] Abnormal -     Mental status: [x] Alert and awake  [x] Oriented to person/place/time [x] Able to follow commands    [] Abnormal -     Eyes:   EOM    [x]  Normal    [] Abnormal -   Sclera  [x]  Normal    [] Abnormal -          Discharge [x]  None visible   [] Abnormal -     HENT: [x] Normocephalic, atraumatic  [] Abnormal -   [x] Mouth/Throat: Mucous membranes are moist    External Ears [x] Normal  [] Abnormal -    Neck: [x] No visualized mass [] Abnormal -     Pulmonary/Chest: [x] Respiratory effort normal   [x] No visualized signs of difficulty breathing or respiratory distress        [] Abnormal -      Musculoskeletal:   [x] Normal gait with no signs of ataxia         [x] Normal range of motion of neck        [] Abnormal -     Neurological:        [x] No Facial Asymmetry (Cranial nerve 7 motor function) (limited exam due to video visit)          [x] No gaze palsy        [] Abnormal -          Skin:        [x] No significant exanthematous lesions or discoloration noted on facial skin         [] Abnormal -            Psychiatric:       [x] Normal Affect [] Abnormal -        [x] No Hallucinations    Other pertinent observable physical exam findings:-        We discussed the expected course, resolution and complications of the diagnosis(es) in detail. Medication risks, benefits, costs, interactions, and alternatives were discussed as indicated. I advised her to contact the office if her condition worsens, changes or fails to improve as anticipated. She expressed understanding with the diagnosis(es) and plan. Kenneth Puente is a 63 y.o. female who was evaluated by a video visit encounter for concerns as above. Patient identification was verified prior to start of the visit. A caregiver was present when appropriate. Due to this being a TeleHealth encounter (During PWFRS-74 public health emergency), evaluation of the following organ systems was limited: Vitals/Constitutional/EENT/Resp/CV/GI//MS/Neuro/Skin/Heme-Lymph-Imm. Pursuant to the emergency declaration under the [facility], 1135 waiver authority and the [place] [employer] and [employer] Act, this Virtual  Visit was conducted, with patient's (and/or legal guardian's) consent, to reduce the patient's risk of exposure to COVID-19 and provide necessary medical care. Services were provided through a video synchronous discussion virtually to substitute for in-person clinic visit. Patient was at home and I was in office for this video visit. Griselda Pennington.  Agata Hobbs MD

## 2020-10-23 ENCOUNTER — TELEPHONE (OUTPATIENT)
Dept: FAMILY MEDICINE CLINIC | Age: 60
End: 2020-10-23

## 2020-10-23 DIAGNOSIS — I10 ESSENTIAL HYPERTENSION: ICD-10-CM

## 2020-10-23 DIAGNOSIS — E03.9 ACQUIRED HYPOTHYROIDISM: ICD-10-CM

## 2020-10-23 RX ORDER — LEVOTHYROXINE SODIUM 100 UG/1
100 TABLET ORAL
Qty: 90 TAB | Refills: 1 | Status: SHIPPED | OUTPATIENT
Start: 2020-10-23 | End: 2021-03-04 | Stop reason: SDUPTHER

## 2020-10-23 RX ORDER — LISINOPRIL 20 MG/1
TABLET ORAL
Qty: 90 TAB | Refills: 1 | Status: SHIPPED | OUTPATIENT
Start: 2020-10-23 | End: 2021-03-04 | Stop reason: SDUPTHER

## 2020-10-23 NOTE — TELEPHONE ENCOUNTER
10/23/20  1:59 PM    1. Essential hypertension  - lisinopriL (PRINIVIL, ZESTRIL) 20 mg tablet; TAKE ONE TABLET BY MOUTH DAILY  Dispense: 90 Tab; Refill: 1    2. Acquired hypothyroidism  - levothyroxine (SYNTHROID) 100 mcg tablet; Take 1 Tab by mouth Daily (before breakfast). Dispense: 90 Tab;  Refill: 1      Araseli Friedman MD

## 2020-10-23 NOTE — TELEPHONE ENCOUNTER
Call her. I sent in six months of medicine. She should see us when she can. She'll need to see us at end of 6 months regardless.    [facility]

## 2020-10-23 NOTE — TELEPHONE ENCOUNTER
Pt is requesting a call to discuss medications    She's stating that she will be without insurance due to husbands diagnosis and is looking to see if you can extend her meds for the 6 month f/u until she gets her new insurance. Pt has refused next available appt.

## 2021-01-28 LAB — MAMMOGRAPHY, EXTERNAL: NORMAL

## 2021-02-01 DIAGNOSIS — I10 ESSENTIAL HYPERTENSION: Primary | ICD-10-CM

## 2021-02-01 DIAGNOSIS — E03.9 ACQUIRED HYPOTHYROIDISM: ICD-10-CM

## 2021-02-01 NOTE — PROGRESS NOTES
02/01/21  2:13 PM    1. Essential hypertension  - TSH 3RD GENERATION; Future    2.  Acquired hypothyroidism  - METABOLIC PANEL, BASIC; Future      Kurt Ahumada, MD

## 2021-02-18 LAB
BUN SERPL-MCNC: 13 MG/DL (ref 8–27)
BUN/CREAT SERPL: 18 (ref 12–28)
CALCIUM SERPL-MCNC: 9.1 MG/DL (ref 8.7–10.3)
CHLORIDE SERPL-SCNC: 105 MMOL/L (ref 96–106)
CO2 SERPL-SCNC: 18 MMOL/L (ref 20–29)
CREAT SERPL-MCNC: 0.71 MG/DL (ref 0.57–1)
GLUCOSE SERPL-MCNC: 93 MG/DL (ref 65–99)
POTASSIUM SERPL-SCNC: 4.3 MMOL/L (ref 3.5–5.2)
SODIUM SERPL-SCNC: 142 MMOL/L (ref 134–144)
TSH SERPL DL<=0.005 MIU/L-ACNC: 0.29 UIU/ML (ref 0.45–4.5)

## 2021-02-18 NOTE — PROGRESS NOTES
Your blood work shows that your thyroid dose may be slightly too high. The other labs are fine. We'll review these at your upcoming appointment.   [facility]

## 2021-03-04 ENCOUNTER — VIRTUAL VISIT (OUTPATIENT)
Dept: FAMILY MEDICINE CLINIC | Age: 61
End: 2021-03-04
Payer: COMMERCIAL

## 2021-03-04 DIAGNOSIS — E03.9 ACQUIRED HYPOTHYROIDISM: ICD-10-CM

## 2021-03-04 DIAGNOSIS — I10 ESSENTIAL HYPERTENSION: ICD-10-CM

## 2021-03-04 PROCEDURE — 99213 OFFICE O/P EST LOW 20 MIN: CPT | Performed by: FAMILY MEDICINE

## 2021-03-04 RX ORDER — LISINOPRIL 20 MG/1
TABLET ORAL
Qty: 90 TAB | Refills: 1 | Status: SHIPPED | OUTPATIENT
Start: 2021-03-04 | End: 2021-11-19

## 2021-03-04 RX ORDER — LEVOTHYROXINE SODIUM 88 UG/1
88 TABLET ORAL
Qty: 90 TAB | Refills: 1 | Status: SHIPPED | OUTPATIENT
Start: 2021-03-04 | End: 2021-08-25

## 2021-03-04 NOTE — PROGRESS NOTES
Bharat Dwyer is a 63 y.o. female      Chief Complaint   Patient presents with    Labs     This is a follow-up visit    Medication Refill         1. Have you been to the ER, urgent care clinic since your last visit? Hospitalized since your last visit? No      2. Have you seen or consulted any other health care providers outside of the [address] [place] [address] since your last visit? Include any pap smears or colon screening.   No

## 2021-03-04 NOTE — PROGRESS NOTES
Lynnette Pennington is a 63 y.o. female who was seen by synchronous (real-time) audio-video technology on 3/4/2021. Consent: Lynnette Pennington, who was seen by synchronous (real-time) audio-video technology, and/or her healthcare decision maker, is aware that this patient-initiated, Telehealth encounter on 3/4/2021 is a billable service, with coverage as determined by her insurance carrier. She is aware that she may receive a bill and has provided verbal consent to proceed: Yes. Assessment & Plan:   Diagnoses and all orders for this visit:    1. Acquired hypothyroidism  -     levothyroxine (SYNTHROID) 88 mcg tablet; Take 1 Tab by mouth Daily (before breakfast). Reduce dose of thyroid hormone for suppressed TSH  Recheck 3-4 months    2. Essential hypertension  -     lisinopriL (PRINIVIL, ZESTRIL) 20 mg tablet; TAKE ONE TABLET BY MOUTH DAILY  Continue current rx of HTN      Follow-up and Dispositions    · Return in about 3 months (around 6/4/2021) for Blood pressure follow up, Medication follow up. I spent at least 23 minutes on this visit with this established patient. ([id number])    Subjective:   Lynnette Pennington is a 63 y.o. female who was seen for Labs (This is a follow-up visit) and Medication Refill    Hypothyroidism  Taking meds consistently  TSH suppressed  Only current sx: Has gained some weight and hair falling out a lot    Hypertension  BP good at home  This /83  Usually in 121-410 range systolic          Prior to Admission medications    Medication Sig Start Date End Date Taking? Authorizing Provider   levothyroxine (SYNTHROID) 88 mcg tablet Take 1 Tab by mouth Daily (before breakfast).  3/4/21  Yes Rosie Ruiz MD   lisinopriL (PRINIVIL, ZESTRIL) 20 mg tablet TAKE ONE TABLET BY MOUTH DAILY 3/4/21  Yes Rosie Ruiz MD   lisinopriL (PRINIVIL, ZESTRIL) 20 mg tablet TAKE ONE TABLET BY MOUTH DAILY 10/23/20 3/4/21  Rosie Ruiz MD   levothyroxine (SYNTHROID) 100 mcg tablet Take 1 Tab by mouth Daily (before breakfast). 10/23/20 3/4/21  Sheridan Decker MD   docosahexanoic acid/epa (FISH OIL PO) Take  by mouth. Provider, Historical     No Known Allergies        Review of Systems   Constitutional: Negative for malaise/fatigue and weight loss. Cardiovascular: Negative for chest pain and palpitations. Neurological: Negative for tremors. Psychiatric/Behavioral: Negative. Objective:   Vital Signs: (As obtained by patient/caregiver at home)  There were no vitals taken for this visit.      [INSTRUCTIONS:  \"[x]\" Indicates a positive item  \"[]\" Indicates a negative item  -- DELETE ALL ITEMS NOT EXAMINED]    Constitutional: [x] Appears well-developed and well-nourished [x] No apparent distress      [] Abnormal -     Mental status: [x] Alert and awake  [x] Oriented to person/place/time [x] Able to follow commands    [] Abnormal -     Eyes:   EOM    [x]  Normal    [] Abnormal -   Sclera  [x]  Normal    [] Abnormal -          Discharge [x]  None visible   [] Abnormal -     HENT: [x] Normocephalic, atraumatic  [] Abnormal -   [x] Mouth/Throat: Mucous membranes are moist    External Ears [x] Normal  [] Abnormal -    Neck: [x] No visualized mass [] Abnormal -     Pulmonary/Chest: [x] Respiratory effort normal   [x] No visualized signs of difficulty breathing or respiratory distress        [] Abnormal -      Musculoskeletal:   [x] Normal gait with no signs of ataxia         [x] Normal range of motion of neck        [] Abnormal -     Neurological:        [x] No Facial Asymmetry (Cranial nerve 7 motor function) (limited exam due to video visit)          [x] No gaze palsy        [] Abnormal -          Skin:        [x] No significant exanthematous lesions or discoloration noted on facial skin         [] Abnormal -            Psychiatric:       [x] Normal Affect [] Abnormal -        [x] No Hallucinations    Other pertinent observable physical exam findings:-        We discussed the expected course, resolution and complications of the diagnosis(es) in detail. Medication risks, benefits, costs, interactions, and alternatives were discussed as indicated. I advised her to contact the office if her condition worsens, changes or fails to improve as anticipated. She expressed understanding with the diagnosis(es) and plan. Ford Lacy is a 63 y.o. female who was evaluated by a video visit encounter for concerns as above. Patient identification was verified prior to start of the visit. A caregiver was present when appropriate. Due to this being a TeleHealth encounter (During TPY-61 public health emergency), evaluation of the following organ systems was limited: Vitals/Constitutional/EENT/Resp/CV/GI//MS/Neuro/Skin/Heme-Lymph-Imm. Pursuant to the emergency declaration under the [facility], 1135 waiver authority and the [employer] and [employer] Act, this Virtual  Visit was conducted, with patient's (and/or legal guardian's) consent, to reduce the patient's risk of exposure to COVID-19 and provide necessary medical care. Services were provided through a video synchronous discussion virtually to substitute for in-person clinic visit. Patient was at home and I was in office for this video visit. Merla Carrel.  Debby Nelson MD

## 2021-04-27 ENCOUNTER — PATIENT MESSAGE (OUTPATIENT)
Dept: FAMILY MEDICINE CLINIC | Age: 61
End: 2021-04-27

## 2021-04-27 DIAGNOSIS — E03.9 ACQUIRED HYPOTHYROIDISM: Primary | ICD-10-CM

## 2021-04-30 LAB
COMMENT, HOLDF: NORMAL
SAMPLES BEING HELD,HOLD: NORMAL
T4 SERPL-MCNC: 11.8 UG/DL (ref 4.8–13.9)
TSH SERPL DL<=0.05 MIU/L-ACNC: 0.65 UIU/ML (ref 0.36–3.74)

## 2021-05-03 NOTE — PROGRESS NOTES
Your current thyroid test is right where we want it. You should stick to your current dose of thyroid hormone and take it religiously. We should recheck the blood work in a year. At some point we should see you in the office for a physical and may sure you are up to date on cancer screening and your shots. For instance, I don't have records of your last PAP smear, mammogram and colonoscopy and we can help you set those up if they have lagged due to Woody. See me at your convenience.   [facility]

## 2021-08-24 DIAGNOSIS — E03.9 ACQUIRED HYPOTHYROIDISM: ICD-10-CM

## 2021-08-25 RX ORDER — LEVOTHYROXINE SODIUM 88 UG/1
TABLET ORAL
Qty: 90 TABLET | Refills: 1 | Status: SHIPPED | OUTPATIENT
Start: 2021-08-25 | End: 2022-02-19

## 2021-09-03 ENCOUNTER — OFFICE VISIT (OUTPATIENT)
Dept: FAMILY MEDICINE CLINIC | Age: 61
End: 2021-09-03
Payer: COMMERCIAL

## 2021-09-03 VITALS
DIASTOLIC BLOOD PRESSURE: 80 MMHG | HEART RATE: 64 BPM | TEMPERATURE: 97.8 F | SYSTOLIC BLOOD PRESSURE: 152 MMHG | BODY MASS INDEX: 27.68 KG/M2 | RESPIRATION RATE: 16 BRPM | WEIGHT: 141 LBS | OXYGEN SATURATION: 99 % | HEIGHT: 60 IN

## 2021-09-03 DIAGNOSIS — Z11.59 ENCOUNTER FOR HEPATITIS C SCREENING TEST FOR LOW RISK PATIENT: ICD-10-CM

## 2021-09-03 DIAGNOSIS — E03.9 ACQUIRED HYPOTHYROIDISM: ICD-10-CM

## 2021-09-03 DIAGNOSIS — M81.0 OSTEOPOROSIS WITHOUT CURRENT PATHOLOGICAL FRACTURE, UNSPECIFIED OSTEOPOROSIS TYPE: ICD-10-CM

## 2021-09-03 DIAGNOSIS — I10 ESSENTIAL HYPERTENSION: ICD-10-CM

## 2021-09-03 DIAGNOSIS — Z00.00 ANNUAL PHYSICAL EXAM: Primary | ICD-10-CM

## 2021-09-03 LAB
ALBUMIN SERPL-MCNC: 4.3 G/DL (ref 3.5–5)
ALBUMIN/GLOB SERPL: 1.4 {RATIO} (ref 1.1–2.2)
ALP SERPL-CCNC: 49 U/L (ref 45–117)
ALT SERPL-CCNC: 26 U/L (ref 12–78)
ANION GAP SERPL CALC-SCNC: 5 MMOL/L (ref 5–15)
AST SERPL-CCNC: 16 U/L (ref 15–37)
BASOPHILS # BLD: 0.1 K/UL (ref 0–0.1)
BASOPHILS NFR BLD: 1 % (ref 0–1)
BILIRUB DIRECT SERPL-MCNC: 0.1 MG/DL (ref 0–0.2)
BILIRUB SERPL-MCNC: 0.5 MG/DL (ref 0.2–1)
BUN SERPL-MCNC: 16 MG/DL (ref 6–20)
BUN/CREAT SERPL: 23 (ref 12–20)
CALCIUM SERPL-MCNC: 8.9 MG/DL (ref 8.5–10.1)
CHLORIDE SERPL-SCNC: 109 MMOL/L (ref 97–108)
CHOLEST SERPL-MCNC: 190 MG/DL
CO2 SERPL-SCNC: 27 MMOL/L (ref 21–32)
CREAT SERPL-MCNC: 0.71 MG/DL (ref 0.55–1.02)
CREAT UR-MCNC: 68.3 MG/DL
DIFFERENTIAL METHOD BLD: ABNORMAL
EOSINOPHIL # BLD: 0.2 K/UL (ref 0–0.4)
EOSINOPHIL NFR BLD: 3 % (ref 0–7)
ERYTHROCYTE [DISTWIDTH] IN BLOOD BY AUTOMATED COUNT: 12.8 % (ref 11.5–14.5)
EST. AVERAGE GLUCOSE BLD GHB EST-MCNC: 103 MG/DL
GLOBULIN SER CALC-MCNC: 3 G/DL (ref 2–4)
GLUCOSE SERPL-MCNC: 86 MG/DL (ref 65–100)
HBA1C MFR BLD: 5.2 % (ref 4–5.6)
HCT VFR BLD AUTO: 44 % (ref 35–47)
HCV AB SERPL QL IA: NONREACTIVE
HDLC SERPL-MCNC: 56 MG/DL
HDLC SERPL: 3.4 {RATIO} (ref 0–5)
HGB BLD-MCNC: 13.7 G/DL (ref 11.5–16)
IMM GRANULOCYTES # BLD AUTO: 0 K/UL (ref 0–0.04)
IMM GRANULOCYTES NFR BLD AUTO: 1 % (ref 0–0.5)
LDLC SERPL CALC-MCNC: 111.2 MG/DL (ref 0–100)
LYMPHOCYTES # BLD: 1.2 K/UL (ref 0.8–3.5)
LYMPHOCYTES NFR BLD: 21 % (ref 12–49)
MCH RBC QN AUTO: 30.6 PG (ref 26–34)
MCHC RBC AUTO-ENTMCNC: 31.1 G/DL (ref 30–36.5)
MCV RBC AUTO: 98.4 FL (ref 80–99)
MICROALBUMIN UR-MCNC: 0.91 MG/DL
MICROALBUMIN/CREAT UR-RTO: 13 MG/G (ref 0–30)
MONOCYTES # BLD: 0.3 K/UL (ref 0–1)
MONOCYTES NFR BLD: 5 % (ref 5–13)
NEUTS SEG # BLD: 3.9 K/UL (ref 1.8–8)
NEUTS SEG NFR BLD: 69 % (ref 32–75)
NRBC # BLD: 0 K/UL (ref 0–0.01)
NRBC BLD-RTO: 0 PER 100 WBC
PLATELET # BLD AUTO: 274 K/UL (ref 150–400)
PMV BLD AUTO: 10.5 FL (ref 8.9–12.9)
POTASSIUM SERPL-SCNC: 4.4 MMOL/L (ref 3.5–5.1)
PROT SERPL-MCNC: 7.3 G/DL (ref 6.4–8.2)
RBC # BLD AUTO: 4.47 M/UL (ref 3.8–5.2)
SODIUM SERPL-SCNC: 141 MMOL/L (ref 136–145)
TRIGL SERPL-MCNC: 114 MG/DL (ref ?–150)
TSH SERPL DL<=0.05 MIU/L-ACNC: 1.41 UIU/ML (ref 0.36–3.74)
UR CULT HOLD, URHOLD: NORMAL
VLDLC SERPL CALC-MCNC: 22.8 MG/DL
WBC # BLD AUTO: 5.7 K/UL (ref 3.6–11)

## 2021-09-03 PROCEDURE — 99213 OFFICE O/P EST LOW 20 MIN: CPT | Performed by: FAMILY MEDICINE

## 2021-09-03 PROCEDURE — 99396 PREV VISIT EST AGE 40-64: CPT | Performed by: FAMILY MEDICINE

## 2021-09-03 RX ORDER — GLUCOSAMINE SULFATE 1500 MG
2000 POWDER IN PACKET (EA) ORAL DAILY
COMMUNITY

## 2021-09-03 RX ORDER — IBANDRONATE SODIUM 150 MG/1
150 TABLET, FILM COATED ORAL
COMMUNITY

## 2021-09-03 NOTE — PROGRESS NOTES
Miladys Sequeira  57 y.o. female  1960  GGE:[id number]  [facility]  Progress Note     Assessment and Plan:    1. Annual physical exam  Requested colonoscopy and mammogram results    2. Acquired hypothyroidism  Update labs  - TSH 3RD GENERATION; Future    3. Essential hypertension  At goal by home BPs  - HEPATIC FUNCTION PANEL; Future  - LIPID PANEL; Future  - METABOLIC PANEL, BASIC; Future  - CBC WITH AUTOMATED DIFF; Future  - HEMOGLOBIN A1C WITH EAG; Future  - MICROALBUMIN, UR, RAND W/ MICROALB/CREAT RATIO; Future    4. Encounter for hepatitis C screening test for low risk patient  screening  - HEPATITIS C AB; Future    5. Osteoporosis without current pathological fracture, unspecified osteoporosis type  On Boniva from [facility]      Diet, exercise and safety discussed with patient. Diagnoses and plans were discussed with the patient. After visit summary given to the patient as well. Follow-up and Dispositions    · Return in about 6 months (around 3/3/2022) for Blood pressure follow up, Medication follow up. Eloisa Akins MD    Miladys Sequeira is a 57 y.o. female who had concerns including Complete Physical.    Health maintenance:    Hypertension  Good numbers at home  Walks 5 days a week    Hypothyroidism  On replacement    Osteoporosis  ON Boniva, Vitamin D and Ca++    Immunizations:    Influenza: up to date   Tetanus: up to date   Shingles: up to date   Pneumovacc 23: not indicated    COVID vaccine: up to date, had [address], no problems    Cancer screening status   Colonoscopy: guidelines reviewed: up to date, records requested   PAP: guidelines reviewed: [facility]   Mammogram: guidelines reviewed: done at [facility], records requested.    Lung CT: guidelines reviewed: not indicated    Bone density screening: known osteoporosis    Smoking: past smoker      ETOH: Non drinker    Drugs: Never user    Sexual history: Monogamous heterosexual    Eye exam and Glaucoma Screening: up to date    Last dental exam: up to date    The following sections were reviewed & updated as appropriate: PMH, PSH, FH, and SH. Patient Active Problem List   Diagnosis Code    HTN (hypertension) I10    Acquired hypothyroidism E03.9    Osteoporosis without current pathological fracture M81.0          Prior to Admission medications    Medication Sig Start Date End Date Taking? Authorizing Provider   ibandronate (BONIVA) 150 mg tablet Take 150 mg by mouth every thirty (30) days. Yes Provider, Historical   cholecalciferol (VITAMIN D3) 25 mcg (1,000 unit) cap Take 2,000 Units by mouth daily. Yes Provider, Historical   levothyroxine (SYNTHROID) 88 mcg tablet TAKE ONE TABLET BY MOUTH DAILY BEFORE BREAKFAST 8/25/21  Yes Jimena Sauceda MD   lisinopriL (PRINIVIL, ZESTRIL) 20 mg tablet TAKE ONE TABLET BY MOUTH DAILY 3/4/21  Yes Jimena Sauceda MD   docosahexanoic acid/epa (FISH OIL PO) Take  by mouth. Yes Provider, Historical          No Known Allergies      Smoker:   Social History     Tobacco Use   Smoking Status Former Smoker    Packs/day: 1.00    Types: Cigarettes   Smokeless Tobacco Never Used     ETOH:   Social History     Substance and Sexual Activity   Alcohol Use No       FH:    Family History   Problem Relation Age of Onset    Elevated Lipids Mother     Hypertension Mother     Pancreatic Cancer Mother     Collagen Dis Father     Colon Cancer Father        Review of Systems   Constitutional: Negative for chills, fever and weight loss. Respiratory: Negative for shortness of breath. Cardiovascular: Negative for chest pain and leg swelling. Gastrointestinal: Negative for blood in stool. Genitourinary: Negative for hematuria. Psychiatric/Behavioral: Negative.           Physical Exam:  Visit Vitals  BP (!) 152/80 (BP 1 Location: Left arm, BP Patient Position: Sitting, BP Cuff Size: Adult)   Pulse 64   Temp 97.8 °F (36.6 °C) (Skin)   Resp 16   Ht 4' 11.5\" (1.511 m)   Wt 141 lb (64 kg)   SpO2 99%   BMI 28.00 kg/m²       Physical Examination:  Physical Exam  General appearance - alert, well appearing, and in no distress, oriented to person, place, and time and normal appearing weight  Mental status -  normal mood, behavior, speech, dress, motor activity, and thought processes, no expressed suicidal or homicidal ideation, no hallucinations  Ears - bilateral TM's and external ear canals normal  Nose - normal and patent, no erythema, discharge or polyps  Mouth - mucous membranes moist, pharynx normal without lesions  Neck - supple, no significant adenopathy  Breast-sees GYN  Chest - normal chest excursion, normal inspiratory and expiratory function. Clear to ausculation bilaterally. Heart - normal rate, regular rhythm, normal S1, S2, no murmurs, rubs, clicks or gallops, no extremity edema  Abdomen- benign, no organomegaly or masses  GYN-sees GYN  Skin-Varicose veins right  Neuro normal speech, moves all extremities, normal gait  Musculoskeletal- grossly normal joint and motor function. d to person, place, and time and normal appearing weight

## 2021-09-03 NOTE — PROGRESS NOTES
1. Have you been to the ER, urgent care clinic since your last visit? Hospitalized since your last visit? No    2. Have you seen or consulted any other health care providers outside of the [address] [place] [address] since your last visit? Include any pap smears or colon screening. No    Chief Complaint   Patient presents with    Complete Physical     Health Maintenance Due   Topic Date Due    Hepatitis C Screening  Never done    PAP AKA CERVICAL CYTOLOGY  Never done    Colorectal Cancer Screening Combo  Never done    Shingrix Vaccine Age 50> (1 of 2) Never done    Breast Cancer Screen Mammogram  Never done    Flu Vaccine (1) 09/01/2021     Visit Vitals  BP (!) 152/80 (BP 1 Location: Left arm, BP Patient Position: Sitting, BP Cuff Size: Adult)   Pulse 64   Temp 97.8 °F (36.6 °C) (Skin)   Resp 16   Ht 4' 11.5\" (1.511 m)   Wt 141 lb (64 kg)   SpO2 99%   BMI 28.00 kg/m²       Learning Assessment 10/22/2019   PRIMARY LEARNER Patient   HIGHEST LEVEL OF EDUCATION - PRIMARY LEARNER  GRADUATED HIGH SCHOOL OR GED   PRIMARY LANGUAGE ENGLISH   LEARNER PREFERENCE PRIMARY DEMONSTRATION   ANSWERED BY self   RELATIONSHIP SELF     3 most recent PHQ Screens 9/3/2021   Little interest or pleasure in doing things Not at all   Feeling down, depressed, irritable, or hopeless Not at all   Total Score PHQ 2 0     Abuse Screening Questionnaire 9/3/2021   Do you ever feel afraid of your partner? N   Are you in a relationship with someone who physically or mentally threatens you? N   Is it safe for you to go home?  Rosemary Bray

## 2021-09-07 NOTE — PROGRESS NOTES
Your blood work is fine. The thyroid is perfect and the other labs look good. Stick to your medications and see me in 6 months and as needed.   RH

## 2021-11-19 DIAGNOSIS — I10 ESSENTIAL HYPERTENSION: ICD-10-CM

## 2021-11-19 RX ORDER — LISINOPRIL 20 MG/1
TABLET ORAL
Qty: 90 TABLET | Refills: 1 | Status: SHIPPED | OUTPATIENT
Start: 2021-11-19 | End: 2022-03-07 | Stop reason: SDUPTHER

## 2022-02-04 LAB — MAMMOGRAPHY, EXTERNAL: NORMAL

## 2022-03-07 ENCOUNTER — OFFICE VISIT (OUTPATIENT)
Dept: FAMILY MEDICINE CLINIC | Age: 62
End: 2022-03-07
Payer: COMMERCIAL

## 2022-03-07 VITALS
DIASTOLIC BLOOD PRESSURE: 94 MMHG | TEMPERATURE: 98.2 F | BODY MASS INDEX: 27.88 KG/M2 | WEIGHT: 142 LBS | SYSTOLIC BLOOD PRESSURE: 158 MMHG | HEIGHT: 60 IN | HEART RATE: 66 BPM | OXYGEN SATURATION: 100 % | RESPIRATION RATE: 16 BRPM

## 2022-03-07 DIAGNOSIS — I10 ESSENTIAL HYPERTENSION: ICD-10-CM

## 2022-03-07 PROCEDURE — 99213 OFFICE O/P EST LOW 20 MIN: CPT | Performed by: FAMILY MEDICINE

## 2022-03-07 RX ORDER — LISINOPRIL 20 MG/1
20 TABLET ORAL DAILY
Qty: 90 TABLET | Refills: 1 | Status: SHIPPED | OUTPATIENT
Start: 2022-03-07 | End: 2022-09-01 | Stop reason: SDUPTHER

## 2022-03-07 NOTE — PROGRESS NOTES
Brittani Rooney  57 y.o. female  1960  [id number]  [facility]  Progress Note     Encounter Date: 3/7/2022    Assessment and Plan:     Encounter Diagnoses     ICD-10-CM ICD-9-CM   1. Essential hypertension  I10 401.9       1. Essential hypertension  At goal by home BP's   Previous labs good  Continue meds  - lisinopriL (PRINIVIL, ZESTRIL) 20 mg tablet; Take 1 Tablet by mouth daily. Dispense: 90 Tablet; Refill: 1      I have discussed the diagnosis with the patient and the intended plan as seen in the above orders. she has expressed understanding. The patient has received an after-visit summary and questions were answered concerning future plans. I have discussed medication side effects and warnings with the patient as well. Electronically Signed: Yan Lezama MD    Current Medications after this visit     Current Outpatient Medications   Medication Sig    lisinopriL (PRINIVIL, ZESTRIL) 20 mg tablet Take 1 Tablet by mouth daily.  levothyroxine (SYNTHROID) 88 mcg tablet TAKE ONE TABLET BY MOUTH DAILY BEFORE BREAKFAST    ibandronate (BONIVA) 150 mg tablet Take 150 mg by mouth every thirty (30) days.  cholecalciferol (VITAMIN D3) 25 mcg (1,000 unit) cap Take 2,000 Units by mouth daily.  docosahexanoic acid/epa (FISH OIL PO) Take  by mouth. No current facility-administered medications for this visit. Medications Discontinued During This Encounter   Medication Reason    lisinopriL (PRINIVIL, ZESTRIL) 20 mg tablet REORDER     ~~~~~~~~~~~~~~~~~~~~~~~~~~~~~~~~~~~~~~~~~~~~~~~~~~~~~~~~~~~    Chief Complaint   Patient presents with    Hypertension    Follow-up     Medication       History provided by patient  History of Present Illness   Brittani Rooney is a 57 y.o. female who presents to clinic today for:  Hypertension and Follow-up (Medication)    BP  Better at home than here  BP's 120-140 at home.   Takes meds consistently  Walks [facility] many days each week with [place]  30-40 minutes per week    Coping OK with 's dementia    Health Maintenance  Completed by outside provider. Records requested. Pap and Mammogram.  Health Maintenance Due   Topic Date Due    Cervical cancer screen  Never done    Shingrix Vaccine Age 50> (1 of 2) Never done    Breast Cancer Screen Mammogram  Never done    COVID-19 Vaccine (3 - Booster for Pfizer series) 09/10/2021     Review of Systems   Review of Systems   Respiratory: Negative for shortness of breath. Cardiovascular: Negative for chest pain. Psychiatric/Behavioral: Negative. Vitals/Objective:     Vitals:    03/07/22 0857 03/07/22 0859   BP: (!) 147/88 (!) 158/94   Pulse: 66    Resp: 16    Temp: 98.2 °F (36.8 °C)    TempSrc: Temporal    SpO2: 100%    Weight: 142 lb (64.4 kg)    Height: 4' 11.5\" (1.511 m)      Body mass index is 28.2 kg/m². Wt Readings from Last 3 Encounters:   03/07/22 142 lb (64.4 kg)   09/03/21 141 lb (64 kg)   10/22/19 145 lb 8 oz (66 kg)         Objective  Physical Exam  Vitals and nursing note reviewed. Constitutional:       Appearance: Normal appearance. She is not toxic-appearing. HENT:      Head: Normocephalic and atraumatic. Cardiovascular:      Rate and Rhythm: Normal rate and regular rhythm. Heart sounds: Normal heart sounds. No murmur heard. No gallop. Pulmonary:      Effort: Pulmonary effort is normal. No respiratory distress. Breath sounds: Normal breath sounds. No wheezing, rhonchi or rales. Musculoskeletal:      Cervical back: No muscular tenderness. Lymphadenopathy:      Cervical: No cervical adenopathy. Neurological:      Mental Status: She is alert. Psychiatric:         Mood and Affect: Mood normal.         Behavior: Behavior normal.         Thought Content: Thought content normal.         Judgment: Judgment normal.           No results found for this or any previous visit (from the past 24 hour(s)).    Disposition     No future appointments. History   Patient's past medical, surgical and family histories were reviewed and updated. Past Medical History:   Diagnosis Date    HTN (hypertension)     Hypothyroidism     Osteoporosis     On Boniva starting 1/2021     No past surgical history on file.   Family History   Problem Relation Age of Onset    Elevated Lipids Mother     Hypertension Mother     Pancreatic Cancer Mother     Collagen Dis Father     Colon Cancer Father      Social History     Tobacco Use    Smoking status: Former Smoker     Packs/day: 1.00     Types: Cigarettes    Smokeless tobacco: Never Used   Substance Use Topics    Alcohol use: No    Drug use: Never       Allergies   No Known Allergies

## 2022-03-07 NOTE — PROGRESS NOTES
Elina Cruz is a 57 y.o. female      Chief Complaint   Patient presents with    Hypertension    Follow-up     Medication         1. Have you been to the ER, urgent care clinic since your last visit? Hospitalized since your last visit? No       2. Have you seen or consulted any other health care providers outside of the [address] [place] [address] since your last visit? Include any pap smears or colon screening.   No

## 2022-03-07 NOTE — PATIENT INSTRUCTIONS
The goal blood pressure for most patients is 140/90. The whole point of treating blood pressure is to prevent strokes, heart attacks and kidney damage. Persistently elevated blood pressure damages blood vessels and can lead to catastrophic heart problems and strokes. Recommendations for Hypertension (elevated blood pressure):    · Purchase a blood pressure cuff that goes around your upper arm and check blood pressure at least 3 times per week. Write down your numbers for review. Check blood pressure in the morning and evening. Rest for 5 minutes with feet elevated and arm resting on a table (at mid-chest level) when checking blood pressure    · Exercise 30-60 minutes most days of the week, preferably with a mix of cardiovascular and strength training. Exercise can improve blood pressure, even if you do not lose weight! · Limit alcohol intake to less than 3-4 drinks per week. · Avoid tobacco products    · Avoid illegal drugs especially amphetamines  · DASH diet - includes fruits, vegetables, fiber, and less than 2000 mg sodium (salt) daily. · Try to eat a low sugar diet. Sugar worsens diabetes and activates kidney hormones that raise blood pressure.    · Avoid non-steroidal inflammatory medications (NSAIDS) such as ibuprofen, advil, motrin, aleve, and naproxyn as these may increase blood pressure if used long-term    · Avoid OTC decongestants such as pseudopherine, phenylephrine, Afrin  · Take your blood pressure medicine (and aspirin if prescribed) religiously          Nature Made Stress B complex with Zinc and Vitamin C  (Nicotinamide)

## 2022-03-19 PROBLEM — M81.0 OSTEOPOROSIS WITHOUT CURRENT PATHOLOGICAL FRACTURE: Status: ACTIVE | Noted: 2021-09-03

## 2022-03-19 PROBLEM — I10 HTN (HYPERTENSION): Status: ACTIVE | Noted: 2018-10-09

## 2022-03-19 PROBLEM — E03.9 ACQUIRED HYPOTHYROIDISM: Status: ACTIVE | Noted: 2018-10-09

## 2022-05-18 DIAGNOSIS — E03.9 ACQUIRED HYPOTHYROIDISM: ICD-10-CM

## 2022-05-18 RX ORDER — LEVOTHYROXINE SODIUM 88 UG/1
TABLET ORAL
Qty: 90 TABLET | Refills: 1 | Status: SHIPPED | OUTPATIENT
Start: 2022-05-18 | End: 2022-08-14

## 2022-07-27 ENCOUNTER — OFFICE VISIT (OUTPATIENT)
Dept: FAMILY MEDICINE CLINIC | Age: 62
End: 2022-07-27
Payer: COMMERCIAL

## 2022-07-27 VITALS
HEIGHT: 60 IN | HEART RATE: 62 BPM | SYSTOLIC BLOOD PRESSURE: 152 MMHG | OXYGEN SATURATION: 100 % | RESPIRATION RATE: 16 BRPM | BODY MASS INDEX: 27.68 KG/M2 | WEIGHT: 141 LBS | TEMPERATURE: 97.2 F | DIASTOLIC BLOOD PRESSURE: 84 MMHG

## 2022-07-27 DIAGNOSIS — S50.861A INSECT BITE OF RIGHT FOREARM, INITIAL ENCOUNTER: Primary | ICD-10-CM
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
## 2022-07-27 PROCEDURE — 99213 OFFICE O/P EST LOW 20 MIN: CPT | Performed by: STUDENT IN AN ORGANIZED HEALTH CARE EDUCATION/TRAINING PROGRAM

## 2022-07-27 RX ORDER — CEPHALEXIN 500 MG/1
500 CAPSULE ORAL EVERY 8 HOURS
Qty: 21 CAPSULE | Refills: 0 | Status: SHIPPED | OUTPATIENT
Start: 2022-07-27 | End: 2022-08-03

## 2022-07-27 NOTE — PROGRESS NOTES
Assessment/Plan:     Diagnoses and all orders for this visit:    1. Insect bite of right forearm, initial encounter  -     cephALEXin (KEFLEX) 500 mg capsule; Take 1 Capsule by mouth every eight (8) hours for 7 days.  -Insect bite noted on right inner forearm with localized erythema and swelling. -Area of erythema has been marked with a pen.  -Discussed supportive care measures including topical hydrocortisone cream as needed for itching, cetirizine as needed for itching, and using cold compresses as needed.  -Patient prescribed Keflex to have on hand should the erythema expand past demarcated line due to concern for possible secondary cellulitis. -Patient to follow-up should symptoms worsen or fail to improve. Follow-up and Dispositions    Return if symptoms worsen or fail to improve. Discussed expected course/resolution/complications of diagnosis in detail with patient. Medication risks/benefits/costs/interactions/alternatives discussed with patient. Pt expressed understanding with the diagnosis and plan      Subjective:      Phong Huitron is a 60 y.o. female who presents for had concerns including Insect Bite (X1-2 days. Patient reports right arm mass getting worse. ). Current Outpatient Medications   Medication Sig Dispense Refill    cephALEXin (KEFLEX) 500 mg capsule Take 1 Capsule by mouth every eight (8) hours for 7 days. 21 Capsule 0    levothyroxine (SYNTHROID) 88 mcg tablet TAKE ONE TABLET BY MOUTH DAILY BEFORE BREAKFAST 90 Tablet 1    lisinopriL (PRINIVIL, ZESTRIL) 20 mg tablet Take 1 Tablet by mouth daily. 90 Tablet 1    ibandronate (BONIVA) 150 mg tablet Take 150 mg by mouth every thirty (30) days. cholecalciferol (VITAMIN D3) 25 mcg (1,000 unit) cap Take 2,000 Units by mouth daily. docosahexanoic acid/epa (FISH OIL PO) Take  by mouth.          No Known Allergies  Past Medical History:   Diagnosis Date    HTN (hypertension)     Hypothyroidism     Osteoporosis     On Boniva starting 1/2021     History reviewed. No pertinent surgical history. Family History   Problem Relation Age of Onset    Elevated Lipids Mother     Hypertension Mother     Pancreatic Cancer Mother     Collagen Dis Father     Colon Cancer Father      Social History     Socioeconomic History    Marital status:      Spouse name: Not on file    Number of children: Not on file    Years of education: Not on file    Highest education level: Not on file   Occupational History    Not on file   Tobacco Use    Smoking status: Former     Packs/day: 1.00     Types: Cigarettes    Smokeless tobacco: Never   Substance and Sexual Activity    Alcohol use: No    Drug use: Never    Sexual activity: Yes   Other Topics Concern    Not on file   Social History Narrative    Not on file     Social Determinants of Health     Financial Resource Strain: Not on file   Food Insecurity: Not on file   Transportation Needs: Not on file   Physical Activity: Not on file   Stress: Not on file   Social Connections: Not on file   Intimate Partner Violence: Not on file   Housing Stability: Not on file       Patient is a 61 yo female who presents to the office today for concerns of an insect bite. Patient states she had some kind of insect bite that occurred on her right inner upper forearm. Patient states she felt something may have bit or stung her yesterday and just brushed it away but uncertain of what insect. She noted today she had acute swelling of the area and erythema. There has been some itching and has been using topical hydrocortisone cream.  She has marked the area with a pen. Area is probably about 4 cm in diameter. She denies any shortness of breath, throat swelling or closure, fever, chills, nausea, vomiting or any other acute symptoms. ROS:   Review of Systems   Constitutional:  Negative for chills and fever. HENT:  Negative for congestion. Eyes:  Negative for blurred vision.    Respiratory:  Negative for cough and shortness of breath. Cardiovascular:  Negative for chest pain and leg swelling. Gastrointestinal:  Negative for abdominal pain, nausea and vomiting. Skin:  Positive for itching and rash. Neurological:  Negative for dizziness, tingling, weakness and headaches. Objective:   Visit Vitals  BP (!) 152/84 (BP 1 Location: Left upper arm, BP Patient Position: Sitting, BP Cuff Size: Adult)   Pulse 62   Temp 97.2 °F (36.2 °C) (Skin)   Resp 16   Ht 4' 11.5\" (1.511 m)   Wt 141 lb (64 kg)   SpO2 100%   BMI 28.00 kg/m²         Vitals and Nurse Documentation reviewed. Physical Exam  Constitutional:       General: She is not in acute distress. Appearance: Normal appearance. She is not toxic-appearing. HENT:      Head: Normocephalic and atraumatic. Eyes:      Conjunctiva/sclera: Conjunctivae normal.   Cardiovascular:      Rate and Rhythm: Normal rate and regular rhythm. Pulses: Normal pulses. Heart sounds: Normal heart sounds. No murmur heard. No gallop. Pulmonary:      Effort: Pulmonary effort is normal. No respiratory distress. Breath sounds: Normal breath sounds. No wheezing or rhonchi. Abdominal:      General: Bowel sounds are normal. There is no distension. Palpations: Abdomen is soft. Tenderness: There is no abdominal tenderness. There is no guarding. Musculoskeletal:      Cervical back: Neck supple. Skin:     Comments: Raised localized erythematous region noted on right inner upper forearm. Localized swelling noted with some slight warmth. No fluctuance appreciated. Diameter approximately 4 cm or so. Area marked in pen. No drainage noted. Neurological:      Mental Status: She is alert and oriented to person, place, and time.       Gait: Gait normal.

## 2022-07-27 NOTE — PROGRESS NOTES
1. Have you been to the ER, urgent care clinic since your last visit? Hospitalized since your last visit? No    2. Have you seen or consulted any other health care providers outside of the [address] [place] [address] since your last visit? Include any pap smears or colon screening. No    Chief Complaint   Patient presents with    Insect Bite     X1-2 days. Patient reports right arm mass getting worse. Health Maintenance Due   Topic Date Due    Cervical cancer screen  Never done    Shingrix Vaccine Age 49> (1 of 2) Never done    COVID-19 Vaccine (3 - Booster for Pfizer series) 09/10/2021     3 most recent PHQ Screens 7/27/2022   Little interest or pleasure in doing things Not at all   Feeling down, depressed, irritable, or hopeless Not at all   Total Score PHQ 2 0     Abuse Screening Questionnaire 7/27/2022   Do you ever feel afraid of your partner? N   Are you in a relationship with someone who physically or mentally threatens you? N   Is it safe for you to go home?  Y     Visit Vitals  BP (!) 161/78 (BP 1 Location: Left upper arm, BP Patient Position: Sitting, BP Cuff Size: Adult)   Pulse 62   Temp 97.2 °F (36.2 °C) (Skin)   Resp 16   Ht 4' 11.5\" (1.511 m)   Wt 141 lb (64 kg)   SpO2 100%   BMI 28.00 kg/m²

## 2022-08-14 DIAGNOSIS — E03.9 ACQUIRED HYPOTHYROIDISM: ICD-10-CM

## 2022-08-14 RX ORDER — LEVOTHYROXINE SODIUM 88 UG/1
TABLET ORAL
Qty: 90 TABLET | Refills: 1 | Status: SHIPPED | OUTPATIENT
Start: 2022-08-14

## 2022-09-01 ENCOUNTER — OFFICE VISIT (OUTPATIENT)
Dept: FAMILY MEDICINE CLINIC | Age: 62
End: 2022-09-01
Payer: COMMERCIAL

## 2022-09-01 VITALS
BODY MASS INDEX: 27.56 KG/M2 | TEMPERATURE: 98.2 F | RESPIRATION RATE: 16 BRPM | SYSTOLIC BLOOD PRESSURE: 137 MMHG | OXYGEN SATURATION: 97 % | DIASTOLIC BLOOD PRESSURE: 78 MMHG | HEIGHT: 60 IN | WEIGHT: 140.4 LBS | HEART RATE: 68 BPM

## 2022-09-01 DIAGNOSIS — I10 ESSENTIAL HYPERTENSION: Primary | ICD-10-CM

## 2022-09-01 DIAGNOSIS — E03.9 ACQUIRED HYPOTHYROIDISM: ICD-10-CM

## 2022-09-01 DIAGNOSIS — F43.20 ADJUSTMENT DISORDER, UNSPECIFIED TYPE: ICD-10-CM

## 2022-09-01 PROCEDURE — 99214 OFFICE O/P EST MOD 30 MIN: CPT | Performed by: FAMILY MEDICINE

## 2022-09-01 RX ORDER — LISINOPRIL 20 MG/1
20 TABLET ORAL DAILY
Qty: 90 TABLET | Refills: 1 | Status: SHIPPED | OUTPATIENT
Start: 2022-09-01

## 2022-09-01 NOTE — PATIENT INSTRUCTIONS
The goal blood pressure for most patients is 140/90. The whole point of treating blood pressure is to prevent strokes, heart attacks and kidney damage. Persistently elevated blood pressure damages blood vessels and can lead to catastrophic heart problems and strokes. Recommendations for Hypertension (elevated blood pressure):    Purchase a blood pressure cuff that goes around your upper arm and check blood pressure at least 3 times per week. Write down your numbers for review. Check blood pressure in the morning and evening. Rest for 5 minutes with feet elevated and arm resting on a table (at mid-chest level) when checking blood pressure    Exercise 30-60 minutes most days of the week, preferably with a mix of cardiovascular and strength training. Exercise can improve blood pressure, even if you do not lose weight! Limit alcohol intake to less than 3-4 drinks per week. Avoid tobacco products    Avoid illegal drugs especially amphetamines  DASH diet - includes fruits, vegetables, fiber, and less than 2000 mg sodium (salt) daily. Try to eat a low sugar diet. Sugar worsens diabetes and activates kidney hormones that raise blood pressure.    Avoid non-steroidal inflammatory medications (NSAIDS) such as ibuprofen, advil, motrin, aleve, and naproxyn as these may increase blood pressure if used long-term    Avoid OTC decongestants such as pseudopherine, phenylephrine, Afrin  Take your blood pressure medicine (and aspirin if prescribed) religiously

## 2022-09-01 NOTE — PROGRESS NOTES
Alie Frias  60 y.o. female  1960  [id number]  [facility]  Progress Note     Encounter Date: 9/1/2022    Assessment and Plan:     Encounter Diagnoses     ICD-10-CM ICD-9-CM   1. Essential hypertension  I10 401.9   2. Acquired hypothyroidism  E03.9 244.9   3. Adjustment disorder, unspecified type  F43.20 309.9       1. Essential hypertension  At goal  - lisinopriL (PRINIVIL, ZESTRIL) 20 mg tablet; Take 1 Tablet by mouth daily. Dispense: 90 Tablet; Refill: 1  - MICROALBUMIN, UR, RAND W/ MICROALB/CREAT RATIO; Future  - METABOLIC PANEL, BASIC; Future  - LIPID PANEL; Future  - HEPATIC FUNCTION PANEL; Future    2. Acquired hypothyroidism  Continue replacement  - TSH 3RD GENERATION; Future    3. Adjustment disorder, stress  Doing well with multiple stressors. I have discussed the diagnosis with the patient and the intended plan as seen in the above orders. she has expressed understanding. The patient has received an after-visit summary and questions were answered concerning future plans. I have discussed medication side effects and warnings with the patient as well. Electronically Signed: Jagruti Mcconnell MD    Current Medications after this visit     Current Outpatient Medications   Medication Sig    lisinopriL (PRINIVIL, ZESTRIL) 20 mg tablet Take 1 Tablet by mouth daily. levothyroxine (SYNTHROID) 88 mcg tablet TAKE ONE TABLET BY MOUTH DAILY BEFORE BREAKFAST    ibandronate (BONIVA) 150 mg tablet Take 150 mg by mouth every thirty (30) days. cholecalciferol (VITAMIN D3) 25 mcg (1,000 unit) cap Take 2,000 Units by mouth daily. docosahexanoic acid/epa (FISH OIL PO) Take  by mouth. No current facility-administered medications for this visit.      Medications Discontinued During This Encounter   Medication Reason    lisinopriL (PRINIVIL, ZESTRIL) 20 mg tablet REORDER     ~~~~~~~~~~~~~~~~~~~~~~~~~~~~~~~~~~~~~~~~~~~~~~~~~~~~~~~~~~~    Chief Complaint   Patient presents with    Follow-up       History provided by patient  History of Present Illness   Lynnette Pennington is a 60 y.o. female who presents to clinic today for:  Follow-up    Hypertension  At goal  Takes consistently    Hypothyroidism  On replacement  Takes consistently    Osteoporosis  DEXA and meds done by GYN    Multiples stressors   with dementia  Son and grandsons live with her due to ex daughter in [facility] mental illness. Doing well and feels she is coping well. Health Maintenance  Patient will get DEXA and PAP records forwarded to us, Will do at future visit  Health Maintenance Due   Topic Date Due    Cervical cancer screen  Never done    Shingrix Vaccine Age 49> (1 of 2) Never done    COVID-19 Vaccine (3 - Booster for Shoulder Tap series) 09/10/2021    Flu Vaccine (1) 09/01/2022     Review of Systems   Review of Systems   Respiratory:  Negative for shortness of breath. Cardiovascular:  Negative for chest pain. Gastrointestinal:  Negative for blood in stool. Genitourinary:  Negative for hematuria. Psychiatric/Behavioral: Negative. Vitals/Objective:     Vitals:    09/01/22 1021   BP: 137/78   Pulse: 68   Resp: 16   Temp: 98.2 °F (36.8 °C)   TempSrc: Temporal   SpO2: 97%   Weight: 140 lb 6.4 oz (63.7 kg)   Height: 4' 11.5\" (1.511 m)     Body mass index is 27.88 kg/m². Wt Readings from Last 3 Encounters:   09/01/22 140 lb 6.4 oz (63.7 kg)   07/27/22 141 lb (64 kg)   03/07/22 142 lb (64.4 kg)         Objective  Physical Exam  Vitals and nursing note reviewed. Constitutional:       Appearance: Normal appearance. She is not toxic-appearing. HENT:      Head: Normocephalic and atraumatic. Cardiovascular:      Rate and Rhythm: Normal rate and regular rhythm. Heart sounds: Normal heart sounds. No murmur heard. No gallop. Pulmonary:      Effort: Pulmonary effort is normal. No respiratory distress. Breath sounds: Normal breath sounds. No wheezing, rhonchi or rales. Musculoskeletal:      Cervical back: No muscular tenderness. Lymphadenopathy:      Cervical: No cervical adenopathy. Neurological:      Mental Status: She is alert. Psychiatric:         Mood and Affect: Mood normal.         Behavior: Behavior normal.         Thought Content: Thought content normal.         Judgment: Judgment normal.       No results found for this or any previous visit (from the past 24 hour(s)). Disposition     Follow-up and Dispositions    Return in about 6 months (around 3/1/2023) for Blood pressure follow up, Medication follow up. No future appointments. History   Patient's past medical, surgical and family histories were reviewed and updated. Past Medical History:   Diagnosis Date    HTN (hypertension)     Hypothyroidism     Osteoporosis     On Boniva starting 1/2021     No past surgical history on file.   Family History   Problem Relation Age of Onset    Elevated Lipids Mother     Hypertension Mother     Pancreatic Cancer Mother     Collagen Dis Father     Colon Cancer Father      Social History     Tobacco Use    Smoking status: Former     Packs/day: 1.00     Types: Cigarettes    Smokeless tobacco: Never   Substance Use Topics    Alcohol use: No    Drug use: Never       Allergies   No Known Allergies

## 2022-09-01 NOTE — PROGRESS NOTES
Chief Complaint   Patient presents with    Follow-up     1. Have you been to the ER, urgent care clinic since your last visit? Hospitalized since your last visit? No    2. Have you seen or consulted any other health care providers outside of the [address] [place] [address] since your last visit? Include any pap smears or colon screening.  No

## 2022-09-02 LAB
ALBUMIN SERPL-MCNC: 4.2 G/DL (ref 3.5–5)
ALBUMIN/GLOB SERPL: 1.4 {RATIO} (ref 1.1–2.2)
ALP SERPL-CCNC: 48 U/L (ref 45–117)
ALT SERPL-CCNC: 19 U/L (ref 12–78)
ANION GAP SERPL CALC-SCNC: 5 MMOL/L (ref 5–15)
AST SERPL-CCNC: 14 U/L (ref 15–37)
BILIRUB DIRECT SERPL-MCNC: 0.1 MG/DL (ref 0–0.2)
BILIRUB SERPL-MCNC: 0.5 MG/DL (ref 0.2–1)
BUN SERPL-MCNC: 17 MG/DL (ref 6–20)
BUN/CREAT SERPL: 22 (ref 12–20)
CALCIUM SERPL-MCNC: 9.4 MG/DL (ref 8.5–10.1)
CHLORIDE SERPL-SCNC: 107 MMOL/L (ref 97–108)
CHOLEST SERPL-MCNC: 189 MG/DL
CO2 SERPL-SCNC: 28 MMOL/L (ref 21–32)
CREAT SERPL-MCNC: 0.79 MG/DL (ref 0.55–1.02)
CREAT UR-MCNC: 63 MG/DL
GLOBULIN SER CALC-MCNC: 2.9 G/DL (ref 2–4)
GLUCOSE SERPL-MCNC: 92 MG/DL (ref 65–100)
HDLC SERPL-MCNC: 56 MG/DL
HDLC SERPL: 3.4 {RATIO} (ref 0–5)
LDLC SERPL CALC-MCNC: 112.4 MG/DL (ref 0–100)
MICROALBUMIN UR-MCNC: 1.48 MG/DL
MICROALBUMIN/CREAT UR-RTO: 23 MG/G (ref 0–30)
POTASSIUM SERPL-SCNC: 3.8 MMOL/L (ref 3.5–5.1)
PROT SERPL-MCNC: 7.1 G/DL (ref 6.4–8.2)
SODIUM SERPL-SCNC: 140 MMOL/L (ref 136–145)
TRIGL SERPL-MCNC: 103 MG/DL (ref ?–150)
TSH SERPL DL<=0.05 MIU/L-ACNC: 1.4 UIU/ML (ref 0.36–3.74)
VLDLC SERPL CALC-MCNC: 20.6 MG/DL

## 2022-09-02 NOTE — PROGRESS NOTES
Your blood work is fine. The cholesterol and blood sugar are well controlled. Stick to your meds and see me in 6 months.   [facility]

## 2022-10-17 DIAGNOSIS — S50.861A INSECT BITE OF RIGHT FOREARM, INITIAL ENCOUNTER: ICD-10-CM
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
## 2022-10-17 RX ORDER — CEPHALEXIN 500 MG/1
500 CAPSULE ORAL EVERY 8 HOURS
Qty: 21 CAPSULE | Refills: 0 | OUTPATIENT
Start: 2022-10-17 | End: 2022-10-24

## 2022-10-17 NOTE — TELEPHONE ENCOUNTER
PCP: Alicia Méndez MD    Last appt: 9/1/2022  No future appointments. Requested Prescriptions     Pending Prescriptions Disp Refills    cephALEXin (KEFLEX) 500 mg capsule 21 Capsule 0     Sig: Take 1 Capsule by mouth every eight (8) hours for 7 days.        Prior labs and Blood pressures:  BP Readings from Last 3 Encounters:   09/01/22 137/78   07/27/22 (!) 152/84   03/07/22 (!) 158/94     Lab Results   Component Value Date/Time    Sodium 140 09/01/2022 11:20 AM    Potassium 3.8 09/01/2022 11:20 AM    Chloride 107 09/01/2022 11:20 AM    CO2 28 09/01/2022 11:20 AM    Anion gap 5 09/01/2022 11:20 AM    Glucose 92 09/01/2022 11:20 AM    BUN 17 09/01/2022 11:20 AM    Creatinine 0.79 09/01/2022 11:20 AM    BUN/Creatinine ratio 22 (H) 09/01/2022 11:20 AM    GFR est AA >60 09/01/2022 11:20 AM    GFR est non-AA >60 09/01/2022 11:20 AM    Calcium 9.4 09/01/2022 11:20 AM     Lab Results   Component Value Date/Time    Hemoglobin A1c 5.2 09/03/2021 10:04 AM     Lab Results   Component Value Date/Time    Cholesterol, total 189 09/01/2022 11:20 AM    HDL Cholesterol 56 09/01/2022 11:20 AM    LDL, calculated 112.4 (H) 09/01/2022 11:20 AM    VLDL, calculated 20.6 09/01/2022 11:20 AM    Triglyceride 103 09/01/2022 11:20 AM    CHOL/HDL Ratio 3.4 09/01/2022 11:20 AM     No results found for: JACK Jimenez    Lab Results   Component Value Date/Time    TSH 1.40 09/01/2022 11:20 AM

## 2023-02-07 DIAGNOSIS — I10 ESSENTIAL HYPERTENSION: ICD-10-CM

## 2023-02-07 RX ORDER — LISINOPRIL 20 MG/1
TABLET ORAL
Qty: 90 TABLET | Refills: 1 | Status: SHIPPED | OUTPATIENT
Start: 2023-02-07

## 2023-03-01 ENCOUNTER — OFFICE VISIT (OUTPATIENT)
Dept: FAMILY MEDICINE CLINIC | Age: 63
End: 2023-03-01
Payer: COMMERCIAL

## 2023-03-01 VITALS
WEIGHT: 140.2 LBS | HEART RATE: 60 BPM | SYSTOLIC BLOOD PRESSURE: 137 MMHG | HEIGHT: 60 IN | OXYGEN SATURATION: 100 % | TEMPERATURE: 98.6 F | BODY MASS INDEX: 27.52 KG/M2 | RESPIRATION RATE: 16 BRPM | DIASTOLIC BLOOD PRESSURE: 81 MMHG

## 2023-03-01 DIAGNOSIS — I10 ESSENTIAL HYPERTENSION: ICD-10-CM

## 2023-03-01 DIAGNOSIS — E03.9 ACQUIRED HYPOTHYROIDISM: ICD-10-CM

## 2023-03-01 PROCEDURE — 3075F SYST BP GE 130 - 139MM HG: CPT | Performed by: FAMILY MEDICINE

## 2023-03-01 PROCEDURE — 99213 OFFICE O/P EST LOW 20 MIN: CPT | Performed by: FAMILY MEDICINE

## 2023-03-01 PROCEDURE — 3079F DIAST BP 80-89 MM HG: CPT | Performed by: FAMILY MEDICINE

## 2023-03-01 RX ORDER — LEVOTHYROXINE SODIUM 88 UG/1
88 TABLET ORAL
Qty: 90 TABLET | Refills: 1 | Status: SHIPPED | OUTPATIENT
Start: 2023-03-01

## 2023-03-01 RX ORDER — LISINOPRIL 20 MG/1
20 TABLET ORAL DAILY
Qty: 90 TABLET | Refills: 1 | Status: SHIPPED | OUTPATIENT
Start: 2023-03-01

## 2023-03-01 NOTE — PROGRESS NOTES
Virginia Thomas  60 y.o. female  1960  [id number]  [facility]  Progress Note     Encounter Date: 3/1/2023    Assessment and Plan:     Encounter Diagnoses     ICD-10-CM ICD-9-CM   1. Acquired hypothyroidism  E03.9 244.9   2. Essential hypertension  I10 401.9       1. Acquired hypothyroidism  Continue meds, doing well  - levothyroxine (SYNTHROID) 88 mcg tablet; Take 1 Tablet by mouth Daily (before breakfast). Dispense: 90 Tablet; Refill: 1    2. Essential hypertension  At goal, last labs normal in September. Continue meds. - lisinopriL (PRINIVIL, ZESTRIL) 20 mg tablet; Take 1 Tablet by mouth daily. Dispense: 90 Tablet; Refill: 1      I have discussed the diagnosis with the patient and the intended plan as seen in the above orders. she has expressed understanding. The patient has received an after-visit summary and questions were answered concerning future plans. I have discussed medication side effects and warnings with the patient as well. Electronically Signed: Analia Whitmore MD    Current Medications after this visit     Current Outpatient Medications   Medication Sig    levothyroxine (SYNTHROID) 88 mcg tablet Take 1 Tablet by mouth Daily (before breakfast). lisinopriL (PRINIVIL, ZESTRIL) 20 mg tablet Take 1 Tablet by mouth daily. ibandronate (BONIVA) 150 mg tablet Take 150 mg by mouth every thirty (30) days. cholecalciferol (VITAMIN D3) 25 mcg (1,000 unit) cap Take 2,000 Units by mouth daily. docosahexanoic acid/epa (FISH OIL PO) Take  by mouth. No current facility-administered medications for this visit.      Medications Discontinued During This Encounter   Medication Reason    levothyroxine (SYNTHROID) 88 mcg tablet REORDER    lisinopriL (PRINIVIL, ZESTRIL) 20 mg tablet REORDER     ~~~~~~~~~~~~~~~~~~~~~~~~~~~~~~~~~~~~~~~~~~~~~~~~~~~~~~~~~~~    Chief Complaint   Patient presents with    Medication Refill       History provided by patient  History of Present Illness   Ngozi Travis is a 60 y.o. female who presents to clinic today for:  Medication Refill    Hypertension  At goal  Takes meds consistently    Hypothyroidism  Takes meds consistently    Sees GYN For osteoporosis    Health Maintenance  Will do at future visit  Health Maintenance Due   Topic Date Due    Cervical cancer screen  Never done    Shingles Vaccine (1 of 2) Never done    COVID-19 Vaccine (3 - Booster for Paredes Peter series) 06/05/2021    Flu Vaccine (1) 08/01/2022     Review of Systems   Review of Systems   Respiratory:  Negative for shortness of breath. Cardiovascular:  Negative for chest pain. Gastrointestinal:  Negative for blood in stool. Genitourinary:  Negative for hematuria. Psychiatric/Behavioral: Negative. Vitals/Objective:     Vitals:    03/01/23 0919 03/01/23 0920   BP: (!) 149/80 137/81   Pulse: 60    Resp: 16    Temp: 98.6 °F (37 °C)    TempSrc: Temporal    SpO2: 100%    Weight: 140 lb 3.2 oz (63.6 kg)    Height: 4' 11.5\" (1.511 m)      Body mass index is 27.84 kg/m². Wt Readings from Last 3 Encounters:   03/01/23 140 lb 3.2 oz (63.6 kg)   09/01/22 140 lb 6.4 oz (63.7 kg)   07/27/22 141 lb (64 kg)         Objective  Physical Exam  Vitals and nursing note reviewed. Constitutional:       Appearance: Normal appearance. She is not toxic-appearing. HENT:      Head: Normocephalic and atraumatic. Cardiovascular:      Rate and Rhythm: Normal rate and regular rhythm. Heart sounds: Normal heart sounds. No murmur heard. No gallop. Pulmonary:      Effort: Pulmonary effort is normal. No respiratory distress. Breath sounds: Normal breath sounds. No wheezing, rhonchi or rales. Musculoskeletal:      Cervical back: No muscular tenderness. Lymphadenopathy:      Cervical: No cervical adenopathy. Neurological:      Mental Status: She is alert.    Psychiatric:         Mood and Affect: Mood normal.         Behavior: Behavior normal.         Thought Content: Thought content normal.         Judgment: Judgment normal.         No results found for this or any previous visit (from the past 24 hour(s)). Disposition     No future appointments. History   Patient's past medical, surgical and family histories were reviewed and updated. Past Medical History:   Diagnosis Date    HTN (hypertension)     Hypothyroidism     Osteoporosis     On Boniva starting 1/2021     No past surgical history on file.   Family History   Problem Relation Age of Onset    Elevated Lipids Mother     Hypertension Mother     Pancreatic Cancer Mother     Collagen Dis Father     Colon Cancer Father      Social History     Tobacco Use    Smoking status: Former     Packs/day: 1.00     Types: Cigarettes    Smokeless tobacco: Never   Substance Use Topics    Alcohol use: No    Drug use: Never       Allergies   No Known Allergies

## 2023-03-01 NOTE — PROGRESS NOTES
Nilo Leiva is a 60 y.o. female      Chief Complaint   Patient presents with    Medication Refill         1. Have you been to the ER, urgent care clinic since your last visit? Hospitalized since your last visit? No       2. Have you seen or consulted any other health care providers outside of the [address] [place] [address] since your last visit? Include any pap smears or colon screening.   No

## 2023-05-24 RX ORDER — LEVOTHYROXINE SODIUM 88 UG/1
88 TABLET ORAL
COMMUNITY
Start: 2023-03-01 | End: 2023-06-19 | Stop reason: SDUPTHER

## 2023-05-24 RX ORDER — IBANDRONATE SODIUM 150 MG/1
150 TABLET, FILM COATED ORAL
COMMUNITY
End: 2023-06-19 | Stop reason: SDUPTHER

## 2023-05-24 RX ORDER — LISINOPRIL 20 MG/1
20 TABLET ORAL DAILY
COMMUNITY
Start: 2023-03-01 | End: 2023-06-12

## 2023-06-12 RX ORDER — LISINOPRIL 20 MG/1
TABLET ORAL
Qty: 90 TABLET | Refills: 1 | Status: SHIPPED
Start: 2023-06-12 | End: 2023-06-19 | Stop reason: SDUPTHER

## 2023-06-12 NOTE — TELEPHONE ENCOUNTER
Leslie Bowens MD  Office Visit on 09/01/2022   Component Date Value Ref Range Status    Cholesterol, Total 09/01/2022 189  <200 MG/DL Final    Triglycerides 09/01/2022 103  <150 MG/DL Final    Based on NCEP-ATP III:  Triglycerides <150 mg/dL  is considered normal, 150-199 mg/dL  borderline high,  200-499 mg/dL high and  greater than or equal to 500 mg/dL very high. HDL 09/01/2022 56  MG/DL Final    Based on NCEP ATP III, HDL Cholesterol <40 mg/dL is considered low and >60 mg/dL is elevated. LDL Calculated 09/01/2022 112.4 (H)  0 - 100 MG/DL Final    Comment: Based on the NCEP-ATP: LDL-C concentrations are considered  optimal <100 mg/dL,  near optimal/above Normal 100-129 mg/dL  Borderline High: 130-159, High: 160-189 mg/dL  Very High: Greater than or equal to 190 mg/dL      VLDL Cholesterol Calculated 09/01/2022 20.6  MG/DL Final    Chol/HDL Ratio 09/01/2022 3.4  0.0 - 5.0   Final    Sodium 09/01/2022 140  136 - 145 mmol/L Final    Potassium 09/01/2022 3.8  3.5 - 5.1 mmol/L Final    Chloride 09/01/2022 107  97 - 108 mmol/L Final    CO2 09/01/2022 28  21 - 32 mmol/L Final    Anion Gap 09/01/2022 5  5 - 15 mmol/L Final    Glucose 09/01/2022 92  65 - 100 mg/dL Final    BUN 09/01/2022 17  6 - 20 MG/DL Final    Creatinine 09/01/2022 0.79  0.55 - 1.02 MG/DL Final    Bun/Cre Ratio 09/01/2022 22 (H)  12 - 20   Final    GFR  09/01/2022 >60  >60 ml/min/1.73m2 Final    EGFR IF NonAfrican American 09/01/2022 >60  >60 ml/min/1.73m2 Final    Estimated GFR is calculated using the IDMS-traceable Modification of Diet in Renal Disease (MDRD) Study equation, reported for both  Americans (GFRAA) and non- Americans (GFRNA), and normalized to 1.73m2 body surface area. The physician must decide which value applies to the patient.     Calcium 09/01/2022 9.4  8.5 - 10.1 MG/DL Final    TSH 09/01/2022 1.40  0.36 - 3.74 uIU/mL Final    Comment:   Due to TSH heterogeneity, both structurally and degree of

## 2023-06-19 ENCOUNTER — OFFICE VISIT (OUTPATIENT)
Facility: CLINIC | Age: 63
End: 2023-06-19
Payer: MEDICAID

## 2023-06-19 VITALS
BODY MASS INDEX: 26.82 KG/M2 | HEART RATE: 57 BPM | RESPIRATION RATE: 16 BRPM | DIASTOLIC BLOOD PRESSURE: 78 MMHG | HEIGHT: 60 IN | SYSTOLIC BLOOD PRESSURE: 133 MMHG | OXYGEN SATURATION: 98 % | WEIGHT: 136.6 LBS | TEMPERATURE: 98.4 F

## 2023-06-19 DIAGNOSIS — I10 ESSENTIAL (PRIMARY) HYPERTENSION: ICD-10-CM

## 2023-06-19 DIAGNOSIS — M81.0 OSTEOPOROSIS WITHOUT CURRENT PATHOLOGICAL FRACTURE, UNSPECIFIED OSTEOPOROSIS TYPE: ICD-10-CM

## 2023-06-19 DIAGNOSIS — E03.9 ACQUIRED HYPOTHYROIDISM: Primary | ICD-10-CM

## 2023-06-19 PROCEDURE — 3075F SYST BP GE 130 - 139MM HG: CPT | Performed by: FAMILY MEDICINE

## 2023-06-19 PROCEDURE — 3078F DIAST BP <80 MM HG: CPT | Performed by: FAMILY MEDICINE

## 2023-06-19 PROCEDURE — 4004F PT TOBACCO SCREEN RCVD TLK: CPT | Performed by: FAMILY MEDICINE

## 2023-06-19 PROCEDURE — 3017F COLORECTAL CA SCREEN DOC REV: CPT | Performed by: FAMILY MEDICINE

## 2023-06-19 PROCEDURE — G8428 CUR MEDS NOT DOCUMENT: HCPCS | Performed by: FAMILY MEDICINE

## 2023-06-19 PROCEDURE — G8419 CALC BMI OUT NRM PARAM NOF/U: HCPCS | Performed by: FAMILY MEDICINE

## 2023-06-19 PROCEDURE — 99214 OFFICE O/P EST MOD 30 MIN: CPT | Performed by: FAMILY MEDICINE

## 2023-06-19 RX ORDER — LEVOTHYROXINE SODIUM 88 UG/1
88 TABLET ORAL
Qty: 90 TABLET | Refills: 3 | Status: SHIPPED | OUTPATIENT
Start: 2023-06-19

## 2023-06-19 RX ORDER — AZITHROMYCIN 250 MG/1
TABLET, FILM COATED ORAL
COMMUNITY
Start: 2023-04-10 | End: 2023-06-19

## 2023-06-19 RX ORDER — LEVOTHYROXINE SODIUM 88 UG/1
1 TABLET ORAL
COMMUNITY
Start: 2022-08-14 | End: 2023-06-19 | Stop reason: SDUPTHER

## 2023-06-19 RX ORDER — CEPHALEXIN 500 MG/1
CAPSULE ORAL
COMMUNITY
Start: 2022-07-27 | End: 2023-06-19

## 2023-06-19 RX ORDER — LISINOPRIL 20 MG/1
20 TABLET ORAL DAILY
Qty: 90 TABLET | Refills: 3 | Status: SHIPPED | OUTPATIENT
Start: 2023-06-19

## 2023-06-19 RX ORDER — IBANDRONATE SODIUM 150 MG/1
150 TABLET, FILM COATED ORAL
Qty: 3 TABLET | Refills: 3 | Status: SHIPPED | OUTPATIENT
Start: 2023-06-19

## 2023-06-19 RX ORDER — LISINOPRIL 20 MG/1
1 TABLET ORAL DAILY
COMMUNITY
Start: 2023-02-07 | End: 2023-06-19 | Stop reason: SDUPTHER

## 2023-06-19 RX ORDER — AMOXICILLIN 500 MG
CAPSULE ORAL
COMMUNITY

## 2023-06-19 ASSESSMENT — PATIENT HEALTH QUESTIONNAIRE - PHQ9
SUM OF ALL RESPONSES TO PHQ QUESTIONS 1-9: 0
SUM OF ALL RESPONSES TO PHQ9 QUESTIONS 1 & 2: 0
1. LITTLE INTEREST OR PLEASURE IN DOING THINGS: 0
SUM OF ALL RESPONSES TO PHQ QUESTIONS 1-9: 0
2. FEELING DOWN, DEPRESSED OR HOPELESS: 0

## 2023-06-19 ASSESSMENT — ENCOUNTER SYMPTOMS: SHORTNESS OF BREATH: 0

## 2023-06-19 NOTE — PROGRESS NOTES
Anne Simmons  58 y.o. female  1960  [id number]  [facility]ICINE  Progress Note     Encounter Date: 6/19/2023    Assessment and Plan:       ICD-10-CM    1. Acquired hypothyroidism  E03.9 levothyroxine (SYNTHROID) 88 MCG tablet     TSH      2. Essential (primary) hypertension  I10 lisinopril (PRINIVIL;ZESTRIL) 20 MG tablet     Basic Metabolic Panel      3. Osteoporosis without current pathological fracture, unspecified osteoporosis type  M81.0 ibandronate (BONIVA) 150 MG tablet        1. Acquired hypothyroidism-doing well with replacement  -     levothyroxine (SYNTHROID) 88 MCG tablet; Take 1 tablet by mouth every morning (before breakfast), Disp-90 tablet, R-3Normal  -     TSH; Future  2. Essential (primary) hypertension at goal  -     lisinopril (PRINIVIL;ZESTRIL) 20 MG tablet; Take 1 tablet by mouth daily, Disp-90 tablet, R-3Normal  -     Basic Metabolic Panel; Future  3. Osteoporosis without current pathological fracture, unspecified osteoporosis type-refilled  -     ibandronate (BONIVA) 150 MG tablet; Take 1 tablet by mouth every 30 days, Disp-3 tablet, R-3Normal       I have discussed the diagnosis with the patient and the intended plan as seen in the above orders. she has expressed understanding. The patient has received an after-visit summary and questions were answered concerning future plans. I have discussed medication side effects and warnings with the patient as well.     Electronically Signed: Halima King MD    Current Medications after this visit     Current Outpatient Medications   Medication Sig    Ascorbic Acid  MG CPCR     lisinopril (PRINIVIL;ZESTRIL) 20 MG tablet Take 1 tablet by mouth daily    levothyroxine (SYNTHROID) 88 MCG tablet Take 1 tablet by mouth every morning (before breakfast)    ibandronate (BONIVA) 150 MG tablet Take 1 tablet by mouth every 30 days    Omega-3 Fatty Acids (FISH OIL) 1200 MG CAPS Take by mouth    Cholecalciferol 50 MCG

## 2023-06-19 NOTE — PROGRESS NOTES
1. Have you been to the ER, urgent care clinic since your last visit? Hospitalized since your last visit? No    2. Have you seen or consulted any other health care providers outside of the [address] [place] [address] since your last visit? Include any pap smears or colon screening.  No      Chief Complaint   Patient presents with    Medication Refill    Blood Work     Thyroid check

## 2023-06-19 NOTE — PATIENT INSTRUCTIONS
The goal blood pressure for most patients is 140/90. The whole point of treating blood pressure is to prevent strokes, heart attacks and kidney damage. Persistently elevated blood pressure damages blood vessels and can lead to catastrophic heart problems and strokes. Recommendations for Hypertension (elevated blood pressure):    Purchase a blood pressure cuff that goes around your upper arm and check blood pressure at least 3 times per week. Write down your numbers for review. Check blood pressure in the morning and evening. Rest for 5 minutes with feet elevated and arm resting on a table (at mid-chest level) when checking blood pressure    Exercise 30-60 minutes most days of the week, preferably with a mix of cardiovascular and strength training. Exercise can improve blood pressure, even if you do not lose weight! Limit alcohol intake to less than 3-4 drinks per week. Avoid tobacco products    Avoid illegal drugs especially amphetamines  DASH diet - includes fruits, vegetables, fiber, and less than 2000 mg sodium (salt) daily. Try to eat a low sugar diet. Sugar worsens diabetes and activates kidney hormones that raise blood pressure. Avoid non-steroidal inflammatory medications (NSAIDS) such as ibuprofen, advil, motrin, aleve, and naproxyn as these may increase blood pressure if used long-term    Avoid OTC decongestants such as pseudopherine, phenylephrine, Afrin. Take your blood pressure medicine (and aspirin if prescribed) religiously. Mediterranean diet: Choose this heart-healthy diet option  The Mediterranean diet is a heart-healthy eating plan combining elements of Mediterranean-style cooking. Here's how to adopt the Mediterranean diet. If you're looking for a heart-healthy eating plan, the Mediterranean diet might be right for you.  The Mediterranean diet incorporates the basics of healthy eating - plus a splash of flavorful olive oil and perhaps a glass of red wine - among Dressing (No Sutures): steri-strips and pressure dressing

## 2023-06-20 LAB
ANION GAP SERPL CALC-SCNC: 8 MMOL/L (ref 5–15)
BUN SERPL-MCNC: 13 MG/DL (ref 6–20)
BUN/CREAT SERPL: 16 (ref 12–20)
CALCIUM SERPL-MCNC: 9.7 MG/DL (ref 8.5–10.1)
CHLORIDE SERPL-SCNC: 106 MMOL/L (ref 97–108)
CO2 SERPL-SCNC: 27 MMOL/L (ref 21–32)
CREAT SERPL-MCNC: 0.8 MG/DL (ref 0.55–1.02)
GLUCOSE SERPL-MCNC: 138 MG/DL (ref 65–100)
POTASSIUM SERPL-SCNC: 4 MMOL/L (ref 3.5–5.1)
SODIUM SERPL-SCNC: 141 MMOL/L (ref 136–145)
TSH SERPL DL<=0.05 MIU/L-ACNC: 0.83 UIU/ML (ref 0.36–3.74)

## 2023-10-28 DIAGNOSIS — I10 ESSENTIAL (PRIMARY) HYPERTENSION: ICD-10-CM

## 2023-10-30 RX ORDER — LISINOPRIL 20 MG/1
20 TABLET ORAL DAILY
Qty: 90 TABLET | Refills: 1 | Status: SHIPPED | OUTPATIENT
Start: 2023-10-30

## 2023-10-30 NOTE — TELEPHONE ENCOUNTER
PCP: Jama Segundo MD    Last appt: [unfilled]  No future appointments.     Requested Prescriptions     Pending Prescriptions Disp Refills    lisinopril (PRINIVIL;ZESTRIL) 20 MG tablet 90 tablet 3     Sig: Take 1 tablet by mouth daily       Prior labs and Blood pressures:  BP Readings from Last 3 Encounters:   06/19/23 133/78   03/01/23 137/81   09/01/22 137/78     Lab Results   Component Value Date/Time     06/19/2023 01:44 PM    K 4.0 06/19/2023 01:44 PM     06/19/2023 01:44 PM    CO2 27 06/19/2023 01:44 PM    BUN 13 06/19/2023 01:44 PM    GFRAA >60 09/01/2022 11:20 AM     No results found for: \"HBA1C\", \"[id number]\"  Lab Results   Component Value Date/Time    CHOL 189 09/01/2022 11:20 AM    HDL 56 09/01/2022 11:20 AM     No results found for: \"VITD3\", \"VD3RIA\"    Lab Results   Component Value Date/Time    TSH 1.40 09/01/2022 11:20 AM

## 2023-10-31 DIAGNOSIS — E03.9 ACQUIRED HYPOTHYROIDISM: ICD-10-CM

## 2023-10-31 RX ORDER — LEVOTHYROXINE SODIUM 88 UG/1
88 TABLET ORAL
Qty: 90 TABLET | Refills: 1 | Status: SHIPPED | OUTPATIENT
Start: 2023-10-31

## 2023-10-31 NOTE — TELEPHONE ENCOUNTER
PCP: Dimitris Harris MD    Last appt: [unfilled]  No future appointments.     Requested Prescriptions     Pending Prescriptions Disp Refills    levothyroxine (SYNTHROID) 88 MCG tablet 90 tablet 3     Sig: Take 1 tablet by mouth every morning (before breakfast)       Prior labs and Blood pressures:  BP Readings from Last 3 Encounters:   06/19/23 133/78   03/01/23 137/81   09/01/22 137/78     Lab Results   Component Value Date/Time     06/19/2023 01:44 PM    K 4.0 06/19/2023 01:44 PM     06/19/2023 01:44 PM    CO2 27 06/19/2023 01:44 PM    BUN 13 06/19/2023 01:44 PM    GFRAA >60 09/01/2022 11:20 AM     No results found for: \"HBA1C\", \"[id number]\"  Lab Results   Component Value Date/Time    CHOL 189 09/01/2022 11:20 AM    HDL 56 09/01/2022 11:20 AM     No results found for: \"VITD3\", \"VD3RIA\"    Lab Results   Component Value Date/Time    TSH 1.40 09/01/2022 11:20 AM

## 2024-02-21 ENCOUNTER — OFFICE VISIT (OUTPATIENT)
Facility: CLINIC | Age: 64
End: 2024-02-21
Payer: COMMERCIAL

## 2024-02-21 VITALS
WEIGHT: 144.4 LBS | HEART RATE: 65 BPM | TEMPERATURE: 97.9 F | RESPIRATION RATE: 20 BRPM | BODY MASS INDEX: 28.35 KG/M2 | DIASTOLIC BLOOD PRESSURE: 78 MMHG | OXYGEN SATURATION: 96 % | HEIGHT: 60 IN | SYSTOLIC BLOOD PRESSURE: 135 MMHG

## 2024-02-21 DIAGNOSIS — I10 ESSENTIAL (PRIMARY) HYPERTENSION: Primary | ICD-10-CM

## 2024-02-21 DIAGNOSIS — M25.562 ACUTE PAIN OF LEFT KNEE: ICD-10-CM

## 2024-02-21 DIAGNOSIS — E03.9 ACQUIRED HYPOTHYROIDISM: ICD-10-CM

## 2024-02-21 DIAGNOSIS — M81.0 OSTEOPOROSIS WITHOUT CURRENT PATHOLOGICAL FRACTURE, UNSPECIFIED OSTEOPOROSIS TYPE: ICD-10-CM

## 2024-02-21 LAB
CREAT UR-MCNC: 75.2 MG/DL
MICROALBUMIN UR-MCNC: 1.38 MG/DL
MICROALBUMIN/CREAT UR-RTO: 18 MG/G (ref 0–30)

## 2024-02-21 PROCEDURE — 3075F SYST BP GE 130 - 139MM HG: CPT | Performed by: FAMILY MEDICINE

## 2024-02-21 PROCEDURE — 3078F DIAST BP <80 MM HG: CPT | Performed by: FAMILY MEDICINE

## 2024-02-21 PROCEDURE — 99214 OFFICE O/P EST MOD 30 MIN: CPT | Performed by: FAMILY MEDICINE

## 2024-02-21 RX ORDER — LISINOPRIL 20 MG/1
20 TABLET ORAL DAILY
Qty: 90 TABLET | Refills: 1 | Status: SHIPPED | OUTPATIENT
Start: 2024-02-21

## 2024-02-21 RX ORDER — LEVOTHYROXINE SODIUM 88 UG/1
88 TABLET ORAL
Qty: 90 TABLET | Refills: 1 | Status: SHIPPED | OUTPATIENT
Start: 2024-02-21

## 2024-02-21 RX ORDER — IBANDRONATE SODIUM 150 MG/1
150 TABLET, FILM COATED ORAL
Qty: 3 TABLET | Refills: 3 | Status: SHIPPED | OUTPATIENT
Start: 2024-02-21

## 2024-02-21 ASSESSMENT — ENCOUNTER SYMPTOMS
SHORTNESS OF BREATH: 0
BLOOD IN STOOL: 0

## 2024-02-21 ASSESSMENT — PATIENT HEALTH QUESTIONNAIRE - PHQ9
2. FEELING DOWN, DEPRESSED OR HOPELESS: 0
1. LITTLE INTEREST OR PLEASURE IN DOING THINGS: 0
SUM OF ALL RESPONSES TO PHQ QUESTIONS 1-9: 0
SUM OF ALL RESPONSES TO PHQ9 QUESTIONS 1 & 2: 0
SUM OF ALL RESPONSES TO PHQ QUESTIONS 1-9: 0

## 2024-02-21 NOTE — PROGRESS NOTES
dentified pt with two pt identifiers(name and ).    Chief Complaint   Patient presents with    Follow-up     Cholestrol & thyroid    Knee Pain     Back of left knee        Health Maintenance Due   Topic    HIV screen     Cervical cancer screen     Shingles vaccine (1 of 2)    Respiratory Syncytial Virus (RSV) Pregnant or age 60 yrs+ (1 - 1-dose 60+ series)    Flu vaccine (1)    COVID-19 Vaccine (3 - 2023-24 season)    Breast cancer screen        Wt Readings from Last 3 Encounters:   24 65.5 kg (144 lb 6.4 oz)   23 62 kg (136 lb 9.6 oz)   23 63.6 kg (140 lb 3.2 oz)     Temp Readings from Last 3 Encounters:   24 97.9 °F (36.6 °C) (Temporal)   23 98.4 °F (36.9 °C) (Temporal)     BP Readings from Last 3 Encounters:   24 135/78   23 133/78   23 137/81     Pulse Readings from Last 3 Encounters:   24 65   23 57   23 60           Coordination of Care Questionnaire:  :   1. \"Have you been to the ER, urgent care clinic since your last visit?  Hospitalized since your last visit?\" no    2. \"Have you seen or consulted any other health care providers outside of the [facility] since your last visit?\" no     3. For patients aged 45-75: Has the patient had a colonoscopy / FIT/ Cologuard? no      If the patient is female:    4. For patients aged 40-74: Has the patient had a mammogram within the past 2 years? no      5. For patients aged 21-65: Has the patient had a pap smear? no     3) Do you have an Advance Directive on file? no  Are you interested in receiving information about Advance Directives? no    Patient is accompanied by self I have received verbal consent from Rommel Alcantara to discuss any/all medical information while they are present in the room.   
MD    Current Medications after this visit     Current Outpatient Medications   Medication Sig    ibandronate (BONIVA) 150 MG tablet Take 1 tablet by mouth every 30 days    levothyroxine (SYNTHROID) 88 MCG tablet Take 1 tablet by mouth every morning (before breakfast)    lisinopril (PRINIVIL;ZESTRIL) 20 MG tablet Take 1 tablet by mouth daily    Omega-3 Fatty Acids (FISH OIL) 1200 MG CAPS Take by mouth    Cholecalciferol 50 MCG (2000 UT) CAPS 1 capsule daily    Ascorbic Acid  MG CPCR      No current facility-administered medications for this visit.     Medications Discontinued During This Encounter   Medication Reason    ibandronate (BONIVA) 150 MG tablet REORDER    lisinopril (PRINIVIL;ZESTRIL) 20 MG tablet REORDER    levothyroxine (SYNTHROID) 88 MCG tablet REORDER     ~~~~~~~~~~~~~~~~~~~~~~~~~~~~~~~~~~~~~~~~~~~~~~~~~~~~~~~~~~~    Chief Complaint   Patient presents with    Follow-up     Cholestrol & thyroid    Knee Pain     Back of left knee       History provided by patient  History of Present Illness   Rommel Alcantara is a 63 y.o. female who presents to clinic today for:  Follow-up (Cholestrol & thyroid) and Knee Pain (Back of left knee)    BP  At goal  Takes meds consistenlty  Takes fish oil    Knee pain  Left side  Walks a lot a couple of miles  Seems a little sore and swollen left posterior medial knee.  No injury past or present.    Thyroid   Takes consistently  No symptoms currently.    Doing OK emotionally   has alzheimer's (early onset)  She is his main caregiver  She will be on medicare on her next birthday.    Health Maintenance  Will do at future visit  Health Maintenance Due   Topic Date Due    HIV screen  Never done    Cervical cancer screen  Never done    Shingles vaccine (1 of 2) Never done    Respiratory Syncytial Virus (RSV) Pregnant or age 60 yrs+ (1 - 1-dose 60+ series) Never done    Flu vaccine (1) 08/01/2023    COVID-19 Vaccine (3 - 2023-24 season) 09/01/2023    Breast cancer screen

## 2024-02-22 LAB
ALBUMIN SERPL-MCNC: 4.3 G/DL (ref 3.5–5)
ALBUMIN/GLOB SERPL: 1.5 (ref 1.1–2.2)
ALP SERPL-CCNC: 57 U/L (ref 45–117)
ALT SERPL-CCNC: 22 U/L (ref 12–78)
ANION GAP SERPL CALC-SCNC: 4 MMOL/L (ref 5–15)
AST SERPL-CCNC: 17 U/L (ref 15–37)
BILIRUB DIRECT SERPL-MCNC: 0.1 MG/DL (ref 0–0.2)
BILIRUB SERPL-MCNC: 0.5 MG/DL (ref 0.2–1)
BUN SERPL-MCNC: 17 MG/DL (ref 6–20)
BUN/CREAT SERPL: 21 (ref 12–20)
CALCIUM SERPL-MCNC: 9.7 MG/DL (ref 8.5–10.1)
CHLORIDE SERPL-SCNC: 107 MMOL/L (ref 97–108)
CHOLEST SERPL-MCNC: 201 MG/DL
CO2 SERPL-SCNC: 28 MMOL/L (ref 21–32)
CREAT SERPL-MCNC: 0.81 MG/DL (ref 0.55–1.02)
GLOBULIN SER CALC-MCNC: 2.9 G/DL (ref 2–4)
GLUCOSE SERPL-MCNC: 91 MG/DL (ref 65–100)
HDLC SERPL-MCNC: 58 MG/DL
HDLC SERPL: 3.5 (ref 0–5)
LDLC SERPL CALC-MCNC: 124.4 MG/DL (ref 0–100)
POTASSIUM SERPL-SCNC: 4.4 MMOL/L (ref 3.5–5.1)
PROT SERPL-MCNC: 7.2 G/DL (ref 6.4–8.2)
SODIUM SERPL-SCNC: 139 MMOL/L (ref 136–145)
TRIGL SERPL-MCNC: 93 MG/DL
TSH SERPL DL<=0.05 MIU/L-ACNC: 2.97 UIU/ML (ref 0.36–3.74)
VLDLC SERPL CALC-MCNC: 18.6 MG/DL

## 2024-05-09 DIAGNOSIS — M81.0 OSTEOPOROSIS WITHOUT CURRENT PATHOLOGICAL FRACTURE, UNSPECIFIED OSTEOPOROSIS TYPE: ICD-10-CM

## 2024-05-09 RX ORDER — IBANDRONATE SODIUM 150 MG/1
150 TABLET, FILM COATED ORAL
Qty: 3 TABLET | Refills: 3 | Status: SHIPPED | OUTPATIENT
Start: 2024-05-09

## 2024-05-09 NOTE — TELEPHONE ENCOUNTER
PCP: Joe Morales MD    Last appt: [unfilled]  No future appointments.    Requested Prescriptions     Pending Prescriptions Disp Refills    ibandronate (BONIVA) 150 MG tablet 3 tablet 3     Sig: Take 1 tablet by mouth every 30 days       Prior labs and Blood pressures:  BP Readings from Last 3 Encounters:   02/21/24 135/78   06/19/23 133/78   03/01/23 137/81     Lab Results   Component Value Date/Time     02/21/2024 11:59 AM    K 4.4 02/21/2024 11:59 AM     02/21/2024 11:59 AM    CO2 28 02/21/2024 11:59 AM    BUN 17 02/21/2024 11:59 AM    GFRAA >60 09/01/2022 11:20 AM     No results found for: \"HBA1C\", \"[id number]\"  Lab Results   Component Value Date/Time    CHOL 201 02/21/2024 11:59 AM    HDL 58 02/21/2024 11:59 AM    .4 02/21/2024 11:59 AM    VLDL 18.6 02/21/2024 11:59 AM     No results found for: \"VITD3\", \"VD3RIA\"    Lab Results   Component Value Date/Time    TSH 1.40 09/01/2022 11:20 AM

## 2024-06-03 DIAGNOSIS — M81.0 OSTEOPOROSIS WITHOUT CURRENT PATHOLOGICAL FRACTURE, UNSPECIFIED OSTEOPOROSIS TYPE: ICD-10-CM

## 2024-06-03 RX ORDER — IBANDRONATE SODIUM 150 MG/1
150 TABLET, FILM COATED ORAL
Qty: 3 TABLET | Refills: 3 | Status: SHIPPED | OUTPATIENT
Start: 2024-06-03

## 2024-06-06 ENCOUNTER — TELEPHONE (OUTPATIENT)
Facility: CLINIC | Age: 64
End: 2024-06-06

## 2024-06-06 NOTE — TELEPHONE ENCOUNTER
Dr. Morales    [facility] Ibandronate Sodium tablet.  Can you send in an alternative?    shelia

## 2024-06-25 ENCOUNTER — PATIENT MESSAGE (OUTPATIENT)
Facility: CLINIC | Age: 64
End: 2024-06-25

## 2024-06-25 DIAGNOSIS — E03.9 ACQUIRED HYPOTHYROIDISM: ICD-10-CM

## 2024-06-25 DIAGNOSIS — I10 ESSENTIAL (PRIMARY) HYPERTENSION: ICD-10-CM

## 2024-06-26 DIAGNOSIS — I10 ESSENTIAL (PRIMARY) HYPERTENSION: ICD-10-CM

## 2024-06-26 RX ORDER — LISINOPRIL 20 MG/1
20 TABLET ORAL DAILY
Qty: 90 TABLET | Refills: 1 | Status: SHIPPED | OUTPATIENT
Start: 2024-06-26

## 2024-06-26 RX ORDER — LEVOTHYROXINE SODIUM 88 UG/1
88 TABLET ORAL
Qty: 90 TABLET | Refills: 1 | Status: SHIPPED | OUTPATIENT
Start: 2024-06-26

## 2025-02-09 DIAGNOSIS — I10 ESSENTIAL (PRIMARY) HYPERTENSION: ICD-10-CM

## 2025-02-11 RX ORDER — LISINOPRIL 20 MG/1
20 TABLET ORAL DAILY
Qty: 90 TABLET | Refills: 3 | Status: SHIPPED | OUTPATIENT
Start: 2025-02-11

## 2025-04-04 DIAGNOSIS — M81.0 OSTEOPOROSIS WITHOUT CURRENT PATHOLOGICAL FRACTURE, UNSPECIFIED OSTEOPOROSIS TYPE: ICD-10-CM

## 2025-04-07 ENCOUNTER — OFFICE VISIT (OUTPATIENT)
Facility: CLINIC | Age: 65
End: 2025-04-07
Payer: MEDICARE

## 2025-04-07 VITALS
HEIGHT: 59 IN | OXYGEN SATURATION: 97 % | SYSTOLIC BLOOD PRESSURE: 151 MMHG | RESPIRATION RATE: 17 BRPM | BODY MASS INDEX: 28.83 KG/M2 | TEMPERATURE: 97 F | WEIGHT: 143 LBS | DIASTOLIC BLOOD PRESSURE: 83 MMHG | HEART RATE: 66 BPM

## 2025-04-07 DIAGNOSIS — M81.0 OSTEOPOROSIS WITHOUT CURRENT PATHOLOGICAL FRACTURE, UNSPECIFIED OSTEOPOROSIS TYPE: ICD-10-CM

## 2025-04-07 DIAGNOSIS — E03.9 ACQUIRED HYPOTHYROIDISM: ICD-10-CM

## 2025-04-07 DIAGNOSIS — E78.00 HYPERCHOLESTEREMIA: ICD-10-CM

## 2025-04-07 DIAGNOSIS — I10 ESSENTIAL (PRIMARY) HYPERTENSION: ICD-10-CM

## 2025-04-07 DIAGNOSIS — Z00.00 INITIAL MEDICARE ANNUAL WELLNESS VISIT: Primary | ICD-10-CM

## 2025-04-07 DIAGNOSIS — Z23 ENCOUNTER FOR IMMUNIZATION: ICD-10-CM

## 2025-04-07 PROCEDURE — 3077F SYST BP >= 140 MM HG: CPT | Performed by: FAMILY MEDICINE

## 2025-04-07 PROCEDURE — G8427 DOCREV CUR MEDS BY ELIG CLIN: HCPCS | Performed by: FAMILY MEDICINE

## 2025-04-07 PROCEDURE — G8400 PT W/DXA NO RESULTS DOC: HCPCS | Performed by: FAMILY MEDICINE

## 2025-04-07 PROCEDURE — 99214 OFFICE O/P EST MOD 30 MIN: CPT | Performed by: FAMILY MEDICINE

## 2025-04-07 PROCEDURE — 1090F PRES/ABSN URINE INCON ASSESS: CPT | Performed by: FAMILY MEDICINE

## 2025-04-07 PROCEDURE — 3017F COLORECTAL CA SCREEN DOC REV: CPT | Performed by: FAMILY MEDICINE

## 2025-04-07 PROCEDURE — 1123F ACP DISCUSS/DSCN MKR DOCD: CPT | Performed by: FAMILY MEDICINE

## 2025-04-07 PROCEDURE — G0438 PPPS, INITIAL VISIT: HCPCS | Performed by: FAMILY MEDICINE

## 2025-04-07 PROCEDURE — 3079F DIAST BP 80-89 MM HG: CPT | Performed by: FAMILY MEDICINE

## 2025-04-07 PROCEDURE — G8419 CALC BMI OUT NRM PARAM NOF/U: HCPCS | Performed by: FAMILY MEDICINE

## 2025-04-07 PROCEDURE — 4004F PT TOBACCO SCREEN RCVD TLK: CPT | Performed by: FAMILY MEDICINE

## 2025-04-07 RX ORDER — LEVOTHYROXINE SODIUM 88 UG/1
88 TABLET ORAL
Qty: 90 TABLET | Refills: 3 | Status: SHIPPED | OUTPATIENT
Start: 2025-04-07

## 2025-04-07 RX ORDER — IBANDRONATE SODIUM 150 MG/1
150 TABLET, FILM COATED ORAL
Qty: 3 TABLET | Refills: 3 | Status: SHIPPED | OUTPATIENT
Start: 2025-04-07 | End: 2025-04-07 | Stop reason: SDUPTHER

## 2025-04-07 RX ORDER — LISINOPRIL 20 MG/1
20 TABLET ORAL DAILY
Qty: 90 TABLET | Refills: 3 | Status: SHIPPED | OUTPATIENT
Start: 2025-04-07

## 2025-04-07 RX ORDER — IBANDRONATE SODIUM 150 MG/1
TABLET, FILM COATED ORAL
Qty: 3 TABLET | Refills: 3 | Status: SHIPPED | OUTPATIENT
Start: 2025-04-07

## 2025-04-07 ASSESSMENT — PATIENT HEALTH QUESTIONNAIRE - PHQ9
SUM OF ALL RESPONSES TO PHQ QUESTIONS 1-9: 0
1. LITTLE INTEREST OR PLEASURE IN DOING THINGS: NOT AT ALL
SUM OF ALL RESPONSES TO PHQ QUESTIONS 1-9: 0
SUM OF ALL RESPONSES TO PHQ QUESTIONS 1-9: 0
2. FEELING DOWN, DEPRESSED OR HOPELESS: NOT AT ALL
1. LITTLE INTEREST OR PLEASURE IN DOING THINGS: NOT AT ALL
SUM OF ALL RESPONSES TO PHQ QUESTIONS 1-9: 0
SUM OF ALL RESPONSES TO PHQ QUESTIONS 1-9: 0

## 2025-04-07 ASSESSMENT — ENCOUNTER SYMPTOMS
SHORTNESS OF BREATH: 0
BLOOD IN STOOL: 0

## 2025-04-07 ASSESSMENT — LIFESTYLE VARIABLES
HOW MANY STANDARD DRINKS CONTAINING ALCOHOL DO YOU HAVE ON A TYPICAL DAY: PATIENT DOES NOT DRINK
HOW OFTEN DO YOU HAVE A DRINK CONTAINING ALCOHOL: NEVER

## 2025-04-07 NOTE — PROGRESS NOTES
\"Have you been to the ER, urgent care clinic since your last visit?  Hospitalized since your last visit?\"    NO    “Have you seen or consulted any other health care providers outside our system since your last visit?”    NO    Have you had a mammogram?”   NO    Date of last Mammogram: 2/4/2022      “Have you had a pap smear?”    NO    No cervical cancer screening on file

## 2025-04-07 NOTE — PATIENT INSTRUCTIONS
declaration.  Medical power of .  This form lets you name a person to make treatment decisions for you when you can't speak for yourself. This person is called a health care agent (health care proxy, health care surrogate). The form is also called a durable power of  for health care.  If you do not have an advance directive, decisions about your medical care may be made by a family member, or by a doctor or a  who doesn't know you.  It may help to think of an advance directive as a gift to the people who care for you. If you have one, they won't have to make tough decisions by themselves.  For more information, including forms for your state, see the [employer] website (www.[employer].org/planning/advance-directives/).  Follow-up care is a key part of your treatment and safety. Be sure to make and go to all appointments, and call your doctor if you are having problems. It's also a good idea to know your test results and keep a list of the medicines you take.  What should you include in an advance directive?  Many states have a unique advance directive form. (It may ask you to address specific issues.) Or you might use a universal form that's approved by many states.  If your form doesn't tell you what to address, it may be hard to know what to include in your advance directive. Use the questions below to help you get started.  Who do you want to make decisions about your medical care if you are not able to?  What life-support measures do you want if you have a serious illness that gets worse over time or can't be cured?  What are you most afraid of that might happen? (Maybe you're afraid of having pain, losing your independence, or being kept alive by machines.)  Where would you prefer to die? (Your home? A hospital? A nursing home?)  Do you want to donate your organs when you die?  Do you want certain Restorationist practices performed before you die?  When should you call for help?  Be sure to

## 2025-04-08 ENCOUNTER — RESULTS FOLLOW-UP (OUTPATIENT)
Facility: CLINIC | Age: 65
End: 2025-04-08

## 2025-04-08 LAB
25(OH)D3 SERPL-MCNC: 52.7 NG/ML (ref 30–100)
ALBUMIN SERPL-MCNC: 4.5 G/DL (ref 3.5–5)
ALBUMIN/GLOB SERPL: 1.3 (ref 1.1–2.2)
ALP SERPL-CCNC: 57 U/L (ref 45–117)
ALT SERPL-CCNC: 21 U/L (ref 12–78)
ANION GAP SERPL CALC-SCNC: 5 MMOL/L (ref 2–12)
AST SERPL-CCNC: 20 U/L (ref 15–37)
BILIRUB DIRECT SERPL-MCNC: 0.2 MG/DL (ref 0–0.2)
BILIRUB SERPL-MCNC: 0.6 MG/DL (ref 0.2–1)
BUN SERPL-MCNC: 17 MG/DL (ref 6–20)
BUN/CREAT SERPL: 23 (ref 12–20)
CALCIUM SERPL-MCNC: 10 MG/DL (ref 8.5–10.1)
CHLORIDE SERPL-SCNC: 105 MMOL/L (ref 97–108)
CHOLEST SERPL-MCNC: 192 MG/DL
CO2 SERPL-SCNC: 28 MMOL/L (ref 21–32)
CREAT SERPL-MCNC: 0.74 MG/DL (ref 0.55–1.02)
CREAT UR-MCNC: 44.5 MG/DL
GLOBULIN SER CALC-MCNC: 3.4 G/DL (ref 2–4)
GLUCOSE SERPL-MCNC: 93 MG/DL (ref 65–100)
HDLC SERPL-MCNC: 60 MG/DL
HDLC SERPL: 3.2 (ref 0–5)
LDLC SERPL CALC-MCNC: 103.8 MG/DL (ref 0–100)
MICROALBUMIN UR-MCNC: 1 MG/DL
MICROALBUMIN/CREAT UR-RTO: 22 MG/G (ref 0–30)
POTASSIUM SERPL-SCNC: 3.9 MMOL/L (ref 3.5–5.1)
PROT SERPL-MCNC: 7.9 G/DL (ref 6.4–8.2)
SODIUM SERPL-SCNC: 138 MMOL/L (ref 136–145)
TRIGL SERPL-MCNC: 141 MG/DL
TSH SERPL DL<=0.05 MIU/L-ACNC: 2.15 UIU/ML (ref 0.36–3.74)
VLDLC SERPL CALC-MCNC: 28.2 MG/DL